# Patient Record
Sex: FEMALE | Race: WHITE | NOT HISPANIC OR LATINO | Employment: FULL TIME | URBAN - METROPOLITAN AREA
[De-identification: names, ages, dates, MRNs, and addresses within clinical notes are randomized per-mention and may not be internally consistent; named-entity substitution may affect disease eponyms.]

---

## 2020-07-06 ENCOUNTER — LAB VISIT (OUTPATIENT)
Dept: LAB | Facility: HOSPITAL | Age: 65
End: 2020-07-06
Attending: NURSE PRACTITIONER
Payer: COMMERCIAL

## 2020-07-06 DIAGNOSIS — L40.50 PSORIATIC ARTHROPATHY: Primary | ICD-10-CM

## 2020-07-06 LAB
ALBUMIN SERPL BCP-MCNC: 3.9 G/DL (ref 3.5–5.2)
ALP SERPL-CCNC: 59 U/L (ref 55–135)
ALT SERPL W/O P-5'-P-CCNC: 22 U/L (ref 10–44)
ANION GAP SERPL CALC-SCNC: 9 MMOL/L (ref 8–16)
AST SERPL-CCNC: 25 U/L (ref 10–40)
BASOPHILS # BLD AUTO: 0.05 K/UL (ref 0–0.2)
BASOPHILS NFR BLD: 1.1 % (ref 0–1.9)
BILIRUB DIRECT SERPL-MCNC: <0.1 MG/DL (ref 0.1–0.3)
BILIRUB SERPL-MCNC: 0.7 MG/DL (ref 0.1–1)
BUN SERPL-MCNC: 29 MG/DL (ref 8–23)
CALCIUM SERPL-MCNC: 8.6 MG/DL (ref 8.7–10.5)
CHLORIDE SERPL-SCNC: 101 MMOL/L (ref 95–110)
CO2 SERPL-SCNC: 26 MMOL/L (ref 23–29)
CREAT SERPL-MCNC: 1.3 MG/DL (ref 0.5–1.4)
DIFFERENTIAL METHOD: ABNORMAL
EOSINOPHIL # BLD AUTO: 0.1 K/UL (ref 0–0.5)
EOSINOPHIL NFR BLD: 2.8 % (ref 0–8)
ERYTHROCYTE [DISTWIDTH] IN BLOOD BY AUTOMATED COUNT: 13.9 % (ref 11.5–14.5)
EST. GFR  (AFRICAN AMERICAN): 50.1 ML/MIN/1.73 M^2
EST. GFR  (NON AFRICAN AMERICAN): 43.5 ML/MIN/1.73 M^2
GLUCOSE SERPL-MCNC: 85 MG/DL (ref 70–110)
HCT VFR BLD AUTO: 37.2 % (ref 37–48.5)
HGB BLD-MCNC: 12 G/DL (ref 12–16)
IMM GRANULOCYTES # BLD AUTO: 0.01 K/UL (ref 0–0.04)
IMM GRANULOCYTES NFR BLD AUTO: 0.2 % (ref 0–0.5)
LYMPHOCYTES # BLD AUTO: 1.6 K/UL (ref 1–4.8)
LYMPHOCYTES NFR BLD: 35.1 % (ref 18–48)
MCH RBC QN AUTO: 33.1 PG (ref 27–31)
MCHC RBC AUTO-ENTMCNC: 32.3 G/DL (ref 32–36)
MCV RBC AUTO: 103 FL (ref 82–98)
MONOCYTES # BLD AUTO: 0.5 K/UL (ref 0.3–1)
MONOCYTES NFR BLD: 10.3 % (ref 4–15)
NEUTROPHILS # BLD AUTO: 2.3 K/UL (ref 1.8–7.7)
NEUTROPHILS NFR BLD: 50.5 % (ref 38–73)
NRBC BLD-RTO: 0 /100 WBC
PLATELET # BLD AUTO: 226 K/UL (ref 150–350)
PMV BLD AUTO: 10.4 FL (ref 9.2–12.9)
POTASSIUM SERPL-SCNC: 4.4 MMOL/L (ref 3.5–5.1)
PROT SERPL-MCNC: 7.1 G/DL (ref 6–8.4)
RBC # BLD AUTO: 3.62 M/UL (ref 4–5.4)
SODIUM SERPL-SCNC: 136 MMOL/L (ref 136–145)
WBC # BLD AUTO: 4.64 K/UL (ref 3.9–12.7)

## 2020-07-06 PROCEDURE — 86703 HIV-1/HIV-2 1 RESULT ANTBDY: CPT

## 2020-07-06 PROCEDURE — 86480 TB TEST CELL IMMUN MEASURE: CPT

## 2020-07-06 PROCEDURE — 80048 BASIC METABOLIC PNL TOTAL CA: CPT

## 2020-07-06 PROCEDURE — 85025 COMPLETE CBC W/AUTO DIFF WBC: CPT

## 2020-07-06 PROCEDURE — 36415 COLL VENOUS BLD VENIPUNCTURE: CPT

## 2020-07-06 PROCEDURE — 87340 HEPATITIS B SURFACE AG IA: CPT

## 2020-07-06 PROCEDURE — 80076 HEPATIC FUNCTION PANEL: CPT

## 2020-07-07 LAB
HBV SURFACE AG SERPL QL IA: NEGATIVE
HIV 1+2 AB+HIV1 P24 AG SERPL QL IA: NEGATIVE

## 2020-07-08 LAB
GAMMA INTERFERON BACKGROUND BLD IA-ACNC: 0.02 IU/ML
M TB IFN-G CD4+ BCKGRND COR BLD-ACNC: -0.01 IU/ML
MITOGEN IGNF BCKGRD COR BLD-ACNC: 7.93 IU/ML
TB GOLD PLUS: NEGATIVE
TB2 - NIL: -0.01 IU/ML

## 2020-07-15 ENCOUNTER — LAB VISIT (OUTPATIENT)
Dept: LAB | Facility: HOSPITAL | Age: 65
End: 2020-07-15
Attending: NURSE PRACTITIONER
Payer: COMMERCIAL

## 2020-07-15 DIAGNOSIS — L40.50 PSORIASIS WITH ARTHROPATHY: Primary | ICD-10-CM

## 2020-07-15 LAB
ALBUMIN SERPL BCP-MCNC: 3.9 G/DL (ref 3.5–5.2)
ALP SERPL-CCNC: 61 U/L (ref 55–135)
ALT SERPL W/O P-5'-P-CCNC: 23 U/L (ref 10–44)
AST SERPL-CCNC: 25 U/L (ref 10–40)
BASOPHILS # BLD AUTO: 0.03 K/UL (ref 0–0.2)
BASOPHILS NFR BLD: 0.4 % (ref 0–1.9)
BILIRUB DIRECT SERPL-MCNC: 0.1 MG/DL (ref 0.1–0.3)
BILIRUB SERPL-MCNC: 0.7 MG/DL (ref 0.1–1)
DIFFERENTIAL METHOD: ABNORMAL
EOSINOPHIL # BLD AUTO: 0.2 K/UL (ref 0–0.5)
EOSINOPHIL NFR BLD: 3.1 % (ref 0–8)
ERYTHROCYTE [DISTWIDTH] IN BLOOD BY AUTOMATED COUNT: 14.2 % (ref 11.5–14.5)
HCT VFR BLD AUTO: 37 % (ref 37–48.5)
HGB BLD-MCNC: 11.6 G/DL (ref 12–16)
IMM GRANULOCYTES # BLD AUTO: 0.02 K/UL (ref 0–0.04)
IMM GRANULOCYTES NFR BLD AUTO: 0.3 % (ref 0–0.5)
LYMPHOCYTES # BLD AUTO: 2.5 K/UL (ref 1–4.8)
LYMPHOCYTES NFR BLD: 36.2 % (ref 18–48)
MCH RBC QN AUTO: 33 PG (ref 27–31)
MCHC RBC AUTO-ENTMCNC: 31.4 G/DL (ref 32–36)
MCV RBC AUTO: 105 FL (ref 82–98)
MONOCYTES # BLD AUTO: 0.6 K/UL (ref 0.3–1)
MONOCYTES NFR BLD: 8.9 % (ref 4–15)
NEUTROPHILS # BLD AUTO: 3.5 K/UL (ref 1.8–7.7)
NEUTROPHILS NFR BLD: 51.1 % (ref 38–73)
NRBC BLD-RTO: 0 /100 WBC
PLATELET # BLD AUTO: 211 K/UL (ref 150–350)
PMV BLD AUTO: 10.4 FL (ref 9.2–12.9)
PROT SERPL-MCNC: 7.2 G/DL (ref 6–8.4)
RBC # BLD AUTO: 3.52 M/UL (ref 4–5.4)
WBC # BLD AUTO: 6.85 K/UL (ref 3.9–12.7)

## 2020-07-15 PROCEDURE — 36415 COLL VENOUS BLD VENIPUNCTURE: CPT

## 2020-07-15 PROCEDURE — 85025 COMPLETE CBC W/AUTO DIFF WBC: CPT

## 2020-07-15 PROCEDURE — 80076 HEPATIC FUNCTION PANEL: CPT

## 2021-01-25 ENCOUNTER — TELEPHONE (OUTPATIENT)
Dept: ORTHOPEDICS | Facility: CLINIC | Age: 66
End: 2021-01-25

## 2021-02-04 ENCOUNTER — HOSPITAL ENCOUNTER (OUTPATIENT)
Dept: RADIOLOGY | Facility: HOSPITAL | Age: 66
Discharge: HOME | End: 2021-02-04
Attending: ORTHOPAEDIC SURGERY
Payer: COMMERCIAL

## 2021-02-04 ENCOUNTER — TELEPHONE (OUTPATIENT)
Dept: ORTHOPEDICS | Facility: CLINIC | Age: 66
End: 2021-02-04

## 2021-02-04 ENCOUNTER — OFFICE VISIT (OUTPATIENT)
Dept: ORTHOPEDICS | Facility: CLINIC | Age: 66
End: 2021-02-04
Payer: COMMERCIAL

## 2021-02-04 VITALS
RESPIRATION RATE: 18 BRPM | BODY MASS INDEX: 29.19 KG/M2 | HEART RATE: 76 BPM | WEIGHT: 186 LBS | HEIGHT: 67 IN | OXYGEN SATURATION: 98 %

## 2021-02-04 DIAGNOSIS — M25.561 CHRONIC PAIN OF RIGHT KNEE: ICD-10-CM

## 2021-02-04 DIAGNOSIS — M25.561 ACUTE PAIN OF RIGHT KNEE: Primary | ICD-10-CM

## 2021-02-04 DIAGNOSIS — M17.11 PRIMARY OSTEOARTHRITIS OF RIGHT KNEE: Primary | ICD-10-CM

## 2021-02-04 DIAGNOSIS — M25.561 ACUTE PAIN OF RIGHT KNEE: ICD-10-CM

## 2021-02-04 DIAGNOSIS — M25.562 ACUTE PAIN OF LEFT KNEE: Primary | ICD-10-CM

## 2021-02-04 DIAGNOSIS — M25.562 ACUTE PAIN OF LEFT KNEE: ICD-10-CM

## 2021-02-04 DIAGNOSIS — G89.29 CHRONIC PAIN OF RIGHT KNEE: ICD-10-CM

## 2021-02-04 PROCEDURE — 20610 LARGE JOINT ASPIRATION/INJECTION: R KNEE: ICD-10-PCS | Mod: RT,S$GLB,, | Performed by: ORTHOPAEDIC SURGERY

## 2021-02-04 PROCEDURE — 99204 OFFICE O/P NEW MOD 45 MIN: CPT | Mod: 25,S$GLB,, | Performed by: ORTHOPAEDIC SURGERY

## 2021-02-04 PROCEDURE — 20610 DRAIN/INJ JOINT/BURSA W/O US: CPT | Mod: RT,S$GLB,, | Performed by: ORTHOPAEDIC SURGERY

## 2021-02-04 PROCEDURE — 99999 PR PBB SHADOW E&M-EST. PATIENT-LVL III: CPT | Mod: PBBFAC,,, | Performed by: ORTHOPAEDIC SURGERY

## 2021-02-04 PROCEDURE — 1126F AMNT PAIN NOTED NONE PRSNT: CPT | Mod: S$GLB,,, | Performed by: ORTHOPAEDIC SURGERY

## 2021-02-04 PROCEDURE — 1126F PR PAIN SEVERITY QUANTIFIED, NO PAIN PRESENT: ICD-10-PCS | Mod: S$GLB,,, | Performed by: ORTHOPAEDIC SURGERY

## 2021-02-04 PROCEDURE — 73562 X-RAY EXAM OF KNEE 3: CPT | Mod: 26,RT,, | Performed by: RADIOLOGY

## 2021-02-04 PROCEDURE — 73562 X-RAY EXAM OF KNEE 3: CPT | Mod: TC,PN,RT

## 2021-02-04 PROCEDURE — 99204 PR OFFICE/OUTPT VISIT, NEW, LEVL IV, 45-59 MIN: ICD-10-PCS | Mod: 25,S$GLB,, | Performed by: ORTHOPAEDIC SURGERY

## 2021-02-04 PROCEDURE — 3008F PR BODY MASS INDEX (BMI) DOCUMENTED: ICD-10-PCS | Mod: S$GLB,,, | Performed by: ORTHOPAEDIC SURGERY

## 2021-02-04 PROCEDURE — 3008F BODY MASS INDEX DOCD: CPT | Mod: S$GLB,,, | Performed by: ORTHOPAEDIC SURGERY

## 2021-02-04 PROCEDURE — 99999 PR PBB SHADOW E&M-EST. PATIENT-LVL III: ICD-10-PCS | Mod: PBBFAC,,, | Performed by: ORTHOPAEDIC SURGERY

## 2021-02-04 PROCEDURE — 73562 XR KNEE 3 VIEW RIGHT: ICD-10-PCS | Mod: 26,RT,, | Performed by: RADIOLOGY

## 2021-02-04 RX ORDER — PROPRANOLOL HYDROCHLORIDE 60 MG/1
CAPSULE, EXTENDED RELEASE ORAL
COMMUNITY
End: 2022-03-02 | Stop reason: SDUPTHER

## 2021-02-04 RX ORDER — ALENDRONATE SODIUM 70 MG/1
70 TABLET ORAL WEEKLY
COMMUNITY
Start: 2021-01-18 | End: 2022-02-17

## 2021-02-04 RX ORDER — SUMATRIPTAN 50 MG/1
TABLET, FILM COATED ORAL
COMMUNITY
End: 2023-01-30

## 2021-02-04 RX ORDER — TRIAMCINOLONE ACETONIDE 40 MG/ML
40 INJECTION, SUSPENSION INTRA-ARTICULAR; INTRAMUSCULAR
Status: DISCONTINUED | OUTPATIENT
Start: 2021-02-04 | End: 2021-02-04 | Stop reason: HOSPADM

## 2021-02-04 RX ORDER — LEVOTHYROXINE SODIUM 88 UG/1
88 TABLET ORAL DAILY
COMMUNITY
Start: 2021-01-08 | End: 2022-08-22

## 2021-02-04 RX ADMIN — TRIAMCINOLONE ACETONIDE 40 MG: 40 INJECTION, SUSPENSION INTRA-ARTICULAR; INTRAMUSCULAR at 01:02

## 2021-02-19 ENCOUNTER — TELEPHONE (OUTPATIENT)
Dept: ORTHOPEDICS | Facility: CLINIC | Age: 66
End: 2021-02-19

## 2021-03-04 ENCOUNTER — OFFICE VISIT (OUTPATIENT)
Dept: ORTHOPEDICS | Facility: CLINIC | Age: 66
End: 2021-03-04
Payer: COMMERCIAL

## 2021-03-04 VITALS — TEMPERATURE: 98 F | HEIGHT: 67 IN | BODY MASS INDEX: 29.19 KG/M2 | WEIGHT: 186 LBS

## 2021-03-04 DIAGNOSIS — M17.11 PRIMARY OSTEOARTHRITIS OF RIGHT KNEE: Primary | ICD-10-CM

## 2021-03-04 PROCEDURE — 99999 PR PBB SHADOW E&M-EST. PATIENT-LVL III: CPT | Mod: PBBFAC,,, | Performed by: ORTHOPAEDIC SURGERY

## 2021-03-04 PROCEDURE — 3008F BODY MASS INDEX DOCD: CPT | Mod: S$GLB,,, | Performed by: ORTHOPAEDIC SURGERY

## 2021-03-04 PROCEDURE — 1126F PR PAIN SEVERITY QUANTIFIED, NO PAIN PRESENT: ICD-10-PCS | Mod: S$GLB,,, | Performed by: ORTHOPAEDIC SURGERY

## 2021-03-04 PROCEDURE — 99499 UNLISTED E&M SERVICE: CPT | Mod: S$GLB,,, | Performed by: ORTHOPAEDIC SURGERY

## 2021-03-04 PROCEDURE — 1126F AMNT PAIN NOTED NONE PRSNT: CPT | Mod: S$GLB,,, | Performed by: ORTHOPAEDIC SURGERY

## 2021-03-04 PROCEDURE — 99499 NO LOS: ICD-10-PCS | Mod: S$GLB,,, | Performed by: ORTHOPAEDIC SURGERY

## 2021-03-04 PROCEDURE — 3008F PR BODY MASS INDEX (BMI) DOCUMENTED: ICD-10-PCS | Mod: S$GLB,,, | Performed by: ORTHOPAEDIC SURGERY

## 2021-03-04 PROCEDURE — 99999 PR PBB SHADOW E&M-EST. PATIENT-LVL III: ICD-10-PCS | Mod: PBBFAC,,, | Performed by: ORTHOPAEDIC SURGERY

## 2021-06-22 ENCOUNTER — TELEPHONE (OUTPATIENT)
Dept: ORTHOPEDICS | Facility: CLINIC | Age: 66
End: 2021-06-22

## 2021-06-25 ENCOUNTER — OFFICE VISIT (OUTPATIENT)
Dept: ORTHOPEDICS | Facility: CLINIC | Age: 66
End: 2021-06-25
Payer: COMMERCIAL

## 2021-06-25 VITALS
HEART RATE: 78 BPM | RESPIRATION RATE: 19 BRPM | OXYGEN SATURATION: 98 % | BODY MASS INDEX: 29.19 KG/M2 | HEIGHT: 67 IN | WEIGHT: 186 LBS

## 2021-06-25 DIAGNOSIS — M25.561 CHRONIC PAIN OF BOTH KNEES: ICD-10-CM

## 2021-06-25 DIAGNOSIS — M25.562 CHRONIC PAIN OF BOTH KNEES: ICD-10-CM

## 2021-06-25 DIAGNOSIS — M17.0 PRIMARY OSTEOARTHRITIS OF BOTH KNEES: Primary | ICD-10-CM

## 2021-06-25 DIAGNOSIS — G89.29 CHRONIC PAIN OF BOTH KNEES: ICD-10-CM

## 2021-06-25 DIAGNOSIS — M71.21 BAKER CYST, RIGHT: ICD-10-CM

## 2021-06-25 PROCEDURE — 1125F PR PAIN SEVERITY QUANTIFIED, PAIN PRESENT: ICD-10-PCS | Mod: S$GLB,,, | Performed by: ORTHOPAEDIC SURGERY

## 2021-06-25 PROCEDURE — 99214 PR OFFICE/OUTPT VISIT, EST, LEVL IV, 30-39 MIN: ICD-10-PCS | Mod: 25,S$GLB,, | Performed by: ORTHOPAEDIC SURGERY

## 2021-06-25 PROCEDURE — 3008F PR BODY MASS INDEX (BMI) DOCUMENTED: ICD-10-PCS | Mod: S$GLB,,, | Performed by: ORTHOPAEDIC SURGERY

## 2021-06-25 PROCEDURE — 20610 DRAIN/INJ JOINT/BURSA W/O US: CPT | Mod: 50,S$GLB,, | Performed by: ORTHOPAEDIC SURGERY

## 2021-06-25 PROCEDURE — 99999 PR PBB SHADOW E&M-EST. PATIENT-LVL III: CPT | Mod: PBBFAC,,, | Performed by: ORTHOPAEDIC SURGERY

## 2021-06-25 PROCEDURE — 20610 LARGE JOINT ASPIRATION/INJECTION: BILATERAL KNEE: ICD-10-PCS | Mod: 50,S$GLB,, | Performed by: ORTHOPAEDIC SURGERY

## 2021-06-25 PROCEDURE — 99214 OFFICE O/P EST MOD 30 MIN: CPT | Mod: 25,S$GLB,, | Performed by: ORTHOPAEDIC SURGERY

## 2021-06-25 PROCEDURE — 99999 PR PBB SHADOW E&M-EST. PATIENT-LVL III: ICD-10-PCS | Mod: PBBFAC,,, | Performed by: ORTHOPAEDIC SURGERY

## 2021-06-25 PROCEDURE — 1125F AMNT PAIN NOTED PAIN PRSNT: CPT | Mod: S$GLB,,, | Performed by: ORTHOPAEDIC SURGERY

## 2021-06-25 PROCEDURE — 3008F BODY MASS INDEX DOCD: CPT | Mod: S$GLB,,, | Performed by: ORTHOPAEDIC SURGERY

## 2021-06-25 RX ORDER — TRIAMCINOLONE ACETONIDE 40 MG/ML
40 INJECTION, SUSPENSION INTRA-ARTICULAR; INTRAMUSCULAR
Status: DISCONTINUED | OUTPATIENT
Start: 2021-06-25 | End: 2021-06-25 | Stop reason: HOSPADM

## 2021-06-25 RX ADMIN — TRIAMCINOLONE ACETONIDE 40 MG: 40 INJECTION, SUSPENSION INTRA-ARTICULAR; INTRAMUSCULAR at 08:06

## 2021-10-05 ENCOUNTER — OFFICE VISIT (OUTPATIENT)
Dept: ORTHOPEDICS | Facility: CLINIC | Age: 66
End: 2021-10-05
Payer: COMMERCIAL

## 2021-10-05 VITALS — BODY MASS INDEX: 29.19 KG/M2 | HEIGHT: 67 IN | HEART RATE: 94 BPM | WEIGHT: 186 LBS | OXYGEN SATURATION: 98 %

## 2021-10-05 DIAGNOSIS — M70.51 PES ANSERINUS BURSITIS OF RIGHT KNEE: Primary | ICD-10-CM

## 2021-10-05 DIAGNOSIS — M17.11 PRIMARY OSTEOARTHRITIS OF RIGHT KNEE: ICD-10-CM

## 2021-10-05 DIAGNOSIS — M25.561 CHRONIC PAIN OF RIGHT KNEE: ICD-10-CM

## 2021-10-05 DIAGNOSIS — G89.29 CHRONIC PAIN OF RIGHT KNEE: ICD-10-CM

## 2021-10-05 DIAGNOSIS — M71.21 BAKER CYST, RIGHT: ICD-10-CM

## 2021-10-05 PROCEDURE — 1101F PT FALLS ASSESS-DOCD LE1/YR: CPT | Mod: S$GLB,,, | Performed by: ORTHOPAEDIC SURGERY

## 2021-10-05 PROCEDURE — 20610 DRAIN/INJ JOINT/BURSA W/O US: CPT | Mod: RT,S$GLB,, | Performed by: ORTHOPAEDIC SURGERY

## 2021-10-05 PROCEDURE — 1126F AMNT PAIN NOTED NONE PRSNT: CPT | Mod: S$GLB,,, | Performed by: ORTHOPAEDIC SURGERY

## 2021-10-05 PROCEDURE — 20610 LARGE JOINT ASPIRATION/INJECTION: R ANSERINE BURSA: ICD-10-PCS | Mod: RT,S$GLB,, | Performed by: ORTHOPAEDIC SURGERY

## 2021-10-05 PROCEDURE — 1159F PR MEDICATION LIST DOCUMENTED IN MEDICAL RECORD: ICD-10-PCS | Mod: S$GLB,,, | Performed by: ORTHOPAEDIC SURGERY

## 2021-10-05 PROCEDURE — 3008F BODY MASS INDEX DOCD: CPT | Mod: S$GLB,,, | Performed by: ORTHOPAEDIC SURGERY

## 2021-10-05 PROCEDURE — 99999 PR PBB SHADOW E&M-EST. PATIENT-LVL III: CPT | Mod: PBBFAC,,, | Performed by: ORTHOPAEDIC SURGERY

## 2021-10-05 PROCEDURE — 3288F PR FALLS RISK ASSESSMENT DOCUMENTED: ICD-10-PCS | Mod: S$GLB,,, | Performed by: ORTHOPAEDIC SURGERY

## 2021-10-05 PROCEDURE — 3288F FALL RISK ASSESSMENT DOCD: CPT | Mod: S$GLB,,, | Performed by: ORTHOPAEDIC SURGERY

## 2021-10-05 PROCEDURE — 99999 PR PBB SHADOW E&M-EST. PATIENT-LVL III: ICD-10-PCS | Mod: PBBFAC,,, | Performed by: ORTHOPAEDIC SURGERY

## 2021-10-05 PROCEDURE — 99213 OFFICE O/P EST LOW 20 MIN: CPT | Mod: 25,S$GLB,, | Performed by: ORTHOPAEDIC SURGERY

## 2021-10-05 PROCEDURE — 1159F MED LIST DOCD IN RCRD: CPT | Mod: S$GLB,,, | Performed by: ORTHOPAEDIC SURGERY

## 2021-10-05 PROCEDURE — 99213 PR OFFICE/OUTPT VISIT, EST, LEVL III, 20-29 MIN: ICD-10-PCS | Mod: 25,S$GLB,, | Performed by: ORTHOPAEDIC SURGERY

## 2021-10-05 PROCEDURE — 1101F PR PT FALLS ASSESS DOC 0-1 FALLS W/OUT INJ PAST YR: ICD-10-PCS | Mod: S$GLB,,, | Performed by: ORTHOPAEDIC SURGERY

## 2021-10-05 PROCEDURE — 1126F PR PAIN SEVERITY QUANTIFIED, NO PAIN PRESENT: ICD-10-PCS | Mod: S$GLB,,, | Performed by: ORTHOPAEDIC SURGERY

## 2021-10-05 PROCEDURE — 3008F PR BODY MASS INDEX (BMI) DOCUMENTED: ICD-10-PCS | Mod: S$GLB,,, | Performed by: ORTHOPAEDIC SURGERY

## 2021-10-05 RX ORDER — TRIAMCINOLONE ACETONIDE 40 MG/ML
40 INJECTION, SUSPENSION INTRA-ARTICULAR; INTRAMUSCULAR
Status: DISCONTINUED | OUTPATIENT
Start: 2021-10-05 | End: 2021-10-05 | Stop reason: HOSPADM

## 2021-10-05 RX ORDER — FLUOXETINE HYDROCHLORIDE 20 MG/1
CAPSULE ORAL
COMMUNITY
Start: 2021-09-08 | End: 2022-08-22

## 2021-10-05 RX ADMIN — TRIAMCINOLONE ACETONIDE 40 MG: 40 INJECTION, SUSPENSION INTRA-ARTICULAR; INTRAMUSCULAR at 10:10

## 2022-02-03 ENCOUNTER — PATIENT OUTREACH (OUTPATIENT)
Dept: ADMINISTRATIVE | Facility: HOSPITAL | Age: 67
End: 2022-02-03
Payer: MEDICARE

## 2022-02-03 NOTE — LETTER
February 3, 2022    Lynne Lester  [address] [place] [address]             [facility]  [address]  [facility] [address]  Phone: [phone] Dear Frances C Thompson, [facility] is committed to your overall health.  To help you get the most out of each of your visits, we will review your information to make sure you are up to date on all of your recommended tests and/or procedures.      As a new patient to GRISEL SORIANO MD , we may not have your complete medical records.  He has found that your chart shows you may be due for:    Health Maintenance Due   Topic Date Due    Hepatitis C Screening  Never done    Lipid Panel  Never done    TETANUS VACCINE  Never done    Mammogram  Never done    DEXA SCAN  Never done    Colorectal Cancer Screening  Never done    Shingles Vaccine (1 of 2) Never done    Pneumococcal Vaccines (Age 65+) (1 of 1 - PPSV23) Never done    Influenza Vaccine (1) 09/01/2021     If you have had any of the above done at another facility, please let us know so that we may obtain copies from that facility.  If you have a copy of these records, please provide a copy for us to scan into your chart.    If you are currently taking medication, please bring it with you to your appointment for review.    Also, if you have any type of Advanced Directives, please bring them with you to your office visit so we may scan them into your chart.      Pura Og LPN  Performance Improvement Coordinator  [facility]  [address] [facility] [place], [place] [id number] [phone]

## 2022-02-17 ENCOUNTER — OFFICE VISIT (OUTPATIENT)
Dept: FAMILY MEDICINE | Facility: CLINIC | Age: 67
End: 2022-02-17
Payer: MEDICARE

## 2022-02-17 VITALS
HEIGHT: 67 IN | RESPIRATION RATE: 12 BRPM | HEART RATE: 77 BPM | DIASTOLIC BLOOD PRESSURE: 70 MMHG | BODY MASS INDEX: 29.51 KG/M2 | WEIGHT: 188 LBS | OXYGEN SATURATION: 96 % | TEMPERATURE: 98 F | SYSTOLIC BLOOD PRESSURE: 118 MMHG

## 2022-02-17 DIAGNOSIS — M81.0 AGE RELATED OSTEOPOROSIS, UNSPECIFIED PATHOLOGICAL FRACTURE PRESENCE: ICD-10-CM

## 2022-02-17 DIAGNOSIS — G89.29 CHRONIC PAIN OF RIGHT KNEE: ICD-10-CM

## 2022-02-17 DIAGNOSIS — Z76.89 ENCOUNTER TO ESTABLISH CARE: Primary | ICD-10-CM

## 2022-02-17 DIAGNOSIS — G43.709 CHRONIC MIGRAINE WITHOUT AURA WITHOUT STATUS MIGRAINOSUS, NOT INTRACTABLE: ICD-10-CM

## 2022-02-17 DIAGNOSIS — M25.561 CHRONIC PAIN OF RIGHT KNEE: ICD-10-CM

## 2022-02-17 DIAGNOSIS — E03.9 HYPOTHYROIDISM, UNSPECIFIED TYPE: ICD-10-CM

## 2022-02-17 DIAGNOSIS — E66.3 OVERWEIGHT (BMI 25.0-29.9): ICD-10-CM

## 2022-02-17 PROCEDURE — 99204 OFFICE O/P NEW MOD 45 MIN: CPT | Mod: S$GLB,,, | Performed by: FAMILY MEDICINE

## 2022-02-17 PROCEDURE — 1101F PT FALLS ASSESS-DOCD LE1/YR: CPT | Mod: CPTII,S$GLB,, | Performed by: FAMILY MEDICINE

## 2022-02-17 PROCEDURE — 1160F RVW MEDS BY RX/DR IN RCRD: CPT | Mod: CPTII,S$GLB,, | Performed by: FAMILY MEDICINE

## 2022-02-17 PROCEDURE — 3074F PR MOST RECENT SYSTOLIC BLOOD PRESSURE < 130 MM HG: ICD-10-PCS | Mod: CPTII,S$GLB,, | Performed by: FAMILY MEDICINE

## 2022-02-17 PROCEDURE — 1159F PR MEDICATION LIST DOCUMENTED IN MEDICAL RECORD: ICD-10-PCS | Mod: CPTII,S$GLB,, | Performed by: FAMILY MEDICINE

## 2022-02-17 PROCEDURE — 99999 PR PBB SHADOW E&M-EST. PATIENT-LVL IV: ICD-10-PCS | Mod: PBBFAC,,, | Performed by: FAMILY MEDICINE

## 2022-02-17 PROCEDURE — 1101F PR PT FALLS ASSESS DOC 0-1 FALLS W/OUT INJ PAST YR: ICD-10-PCS | Mod: CPTII,S$GLB,, | Performed by: FAMILY MEDICINE

## 2022-02-17 PROCEDURE — 3078F DIAST BP <80 MM HG: CPT | Mod: CPTII,S$GLB,, | Performed by: FAMILY MEDICINE

## 2022-02-17 PROCEDURE — 3288F FALL RISK ASSESSMENT DOCD: CPT | Mod: CPTII,S$GLB,, | Performed by: FAMILY MEDICINE

## 2022-02-17 PROCEDURE — 3074F SYST BP LT 130 MM HG: CPT | Mod: CPTII,S$GLB,, | Performed by: FAMILY MEDICINE

## 2022-02-17 PROCEDURE — 99204 PR OFFICE/OUTPT VISIT, NEW, LEVL IV, 45-59 MIN: ICD-10-PCS | Mod: S$GLB,,, | Performed by: FAMILY MEDICINE

## 2022-02-17 PROCEDURE — 1126F AMNT PAIN NOTED NONE PRSNT: CPT | Mod: CPTII,S$GLB,, | Performed by: FAMILY MEDICINE

## 2022-02-17 PROCEDURE — 1160F PR REVIEW ALL MEDS BY PRESCRIBER/CLIN PHARMACIST DOCUMENTED: ICD-10-PCS | Mod: CPTII,S$GLB,, | Performed by: FAMILY MEDICINE

## 2022-02-17 PROCEDURE — 3078F PR MOST RECENT DIASTOLIC BLOOD PRESSURE < 80 MM HG: ICD-10-PCS | Mod: CPTII,S$GLB,, | Performed by: FAMILY MEDICINE

## 2022-02-17 PROCEDURE — 1126F PR PAIN SEVERITY QUANTIFIED, NO PAIN PRESENT: ICD-10-PCS | Mod: CPTII,S$GLB,, | Performed by: FAMILY MEDICINE

## 2022-02-17 PROCEDURE — 1159F MED LIST DOCD IN RCRD: CPT | Mod: CPTII,S$GLB,, | Performed by: FAMILY MEDICINE

## 2022-02-17 PROCEDURE — 99999 PR PBB SHADOW E&M-EST. PATIENT-LVL IV: CPT | Mod: PBBFAC,,, | Performed by: FAMILY MEDICINE

## 2022-02-17 PROCEDURE — 3008F PR BODY MASS INDEX (BMI) DOCUMENTED: ICD-10-PCS | Mod: CPTII,S$GLB,, | Performed by: FAMILY MEDICINE

## 2022-02-17 PROCEDURE — 3288F PR FALLS RISK ASSESSMENT DOCUMENTED: ICD-10-PCS | Mod: CPTII,S$GLB,, | Performed by: FAMILY MEDICINE

## 2022-02-17 PROCEDURE — 3008F BODY MASS INDEX DOCD: CPT | Mod: CPTII,S$GLB,, | Performed by: FAMILY MEDICINE

## 2022-02-17 RX ORDER — IBANDRONATE SODIUM 150 MG/1
150 TABLET, FILM COATED ORAL
Qty: 1 TABLET | Refills: 11 | Status: SHIPPED | OUTPATIENT
Start: 2022-02-17 | End: 2022-08-23

## 2022-02-17 RX ORDER — CHOLECALCIFEROL (VITAMIN D3) 125 MCG
5000 CAPSULE ORAL
COMMUNITY

## 2022-02-17 RX ORDER — MELOXICAM 15 MG/1
TABLET ORAL
COMMUNITY
Start: 2022-01-11 | End: 2022-04-01 | Stop reason: SDUPTHER

## 2022-02-17 NOTE — PROGRESS NOTES
[facility] Note    Subjective      Ms. Lester is a 66 y.o. female who presents to clinic for [facility]    Patient presents to [facility].  She was previously being seen by Anita Mahan.  She has a history of hypothyroidism currently on levothyroxine 80 mcg daily.  She also has a history migraine headaches.  She takes propanolol 60 mg extended release daily.  She states that her insurance suggested some alternatives due to the propanolol not being on formulary.  She will call back with the those names.  She also has a history of osteoporosis.  She takes vitamin-D and Fosamax but she finds it hard to remember to take the Fosamax at times.  She also has chronic pain particularly of her right knee.  She is seen by Dr. Alford.  Injections have been helpful.  She is at the stage for needing a knee replacement but wants to prolong this.  She also has a history weight loss surgery.  She would like to try Ozempic to help with weight loss.  She tried this about 6 months ago and it was helpful.    [facility] Lynne has a past medical history of Carpal tunnel syndrome, Hypertension, Hypothyroidism (2/17/2022), and Osteoarthritis.   PSX Lynne has a past surgical history that includes Tonsillectomy and Appendectomy.    Lynne's family history includes Alzheimer's disease in her mother; COPD in her father.   GLEN Batista reports that she has quit smoking. She has never used smokeless tobacco. She reports current alcohol use. She reports that she does not use drugs.   MICHAELA Batista is allergic to codeine.   DENNIS Batista has a current medication list which includes the following prescription(s): cholecalciferol (vitamin d3), fluoxetine, levothyroxine, meloxicam, propranolol, sumatriptan, ibandronate, and semaglutide (weight loss).     Review of Systems   Constitutional: Negative for chills and fever.   HENT: Negative for congestion and rhinorrhea.    Eyes: Negative for visual disturbance.   Respiratory:  "Negative for cough and shortness of breath.    Cardiovascular: Negative for chest pain.   Gastrointestinal: Negative for abdominal pain, constipation, diarrhea, nausea and vomiting.   Genitourinary: Negative for dysuria.   Musculoskeletal: Positive for arthralgias. Negative for myalgias.   Skin: Negative for rash.   Neurological: Negative for weakness and headaches.     Objective     /70 (BP Location: Left arm, Patient Position: Sitting, BP Method: Large (Manual))   Pulse 77   Temp 97.9 °F (36.6 °C) (Temporal)   Resp 12   Ht 5' 7" (1.702 m)   Wt 85.3 kg (188 lb)   SpO2 96%   BMI 29.44 kg/m²     Physical Exam  Vitals and nursing note reviewed.   Constitutional:       General: She is not in acute distress.     Appearance: Normal appearance. She is well-developed. She is not diaphoretic.   HENT:      Head: Normocephalic and atraumatic.      Right Ear: External ear normal.      Left Ear: External ear normal.   Eyes:      General:         Right eye: No discharge.         Left eye: No discharge.   Cardiovascular:      Rate and Rhythm: Normal rate and regular rhythm.      Heart sounds: Normal heart sounds.   Pulmonary:      Effort: Pulmonary effort is normal.      Breath sounds: Normal breath sounds. No wheezing or rales.   Skin:     General: Skin is warm and dry.   Neurological:      Mental Status: She is alert and oriented to person, place, and time. Mental status is at baseline.   Psychiatric:         Mood and Affect: Mood normal.         Behavior: Behavior normal.         Thought Content: Thought content normal.         Judgment: Judgment normal.        Assessment/Plan     1. Encounter to establish care     2. Age related osteoporosis, unspecified pathological fracture presence  ibandronate (BONIVA) 150 mg tablet   3. Chronic pain of right knee  Ambulatory referral/consult to Physical/Occupational Therapy   4. Overweight (BMI 25.0-29.9)  semaglutide, weight loss, 0.25 mg/0.5 mL PnIj   5. Chronic migraine " without aura without status migrainosus, not intractable     6. Hypothyroidism, unspecified type       Chronic conditions controlled.  Continue current medications as prescribed.  Will switch Fosamax to the knee but to see if that will be easier to remember to take.  Referral to physical therapy for pain of the knee.  Recommended trying turmeric and glucosamine/chondroitin.  Prescribed Ozempic.  Patient will call back with the name for the alternate to propanolol that is covered by her insurance.  Will obtain records from her previous physicians for review.  Follow-up in 3 months.    Future Appointments   Date Time Provider Department Center   5/19/2022  9:20 AM Tyler Moreno MD [facility]         Tyler Moreno MD  Family Medicine  [facility] [place]

## 2022-02-23 ENCOUNTER — CLINICAL SUPPORT (OUTPATIENT)
Dept: REHABILITATION | Facility: HOSPITAL | Age: 67
End: 2022-02-23
Attending: FAMILY MEDICINE
Payer: MEDICARE

## 2022-02-23 DIAGNOSIS — Z11.59 NEED FOR HEPATITIS C SCREENING TEST: ICD-10-CM

## 2022-02-23 DIAGNOSIS — G89.29 CHRONIC PAIN OF RIGHT KNEE: ICD-10-CM

## 2022-02-23 DIAGNOSIS — Z12.31 OTHER SCREENING MAMMOGRAM: ICD-10-CM

## 2022-02-23 DIAGNOSIS — Z12.11 COLON CANCER SCREENING: ICD-10-CM

## 2022-02-23 DIAGNOSIS — Z74.09 IMPAIRED FUNCTIONAL MOBILITY AND ACTIVITY TOLERANCE: Primary | ICD-10-CM

## 2022-02-23 DIAGNOSIS — M25.561 CHRONIC PAIN OF RIGHT KNEE: ICD-10-CM

## 2022-02-23 PROCEDURE — 97162 PT EVAL MOD COMPLEX 30 MIN: CPT | Mod: PN

## 2022-02-23 NOTE — PLAN OF CARE
"[facility]   Physical Therapy Initial Evaluation     Date: 2/23/2022   Name: Lynne Lester  Clinic Number: [id number]    Therapy Diagnosis:   Encounter Diagnoses   Name Primary?    Impaired functional mobility and activity tolerance Yes    Chronic pain of right knee      Physician: Tyler Moreno MD    Physician Orders: PT Eval and Treat   Medical Diagnosis from Referral: Chronic pain of R knee  Evaluation Date: 2/23/2022  Authorization Period Expiration: 12/31/2022  Plan of Care Expiration: 4/8/2022  Progress Note Due: 3/23/2022  Visit # / Visits authorized: 1/ 1   FOTO: Next survey due week of 3/7/2022    Precautions: Standard     Time In: 1315  Time Out: 1400  Total Appointment Time (timed & untimed codes): 40 minutes      SUBJECTIVE     Date of onset: Chronic, Saw MD 2/17/2022    History of current condition - Lynne reports: she's had problems with both her knees for several years with symptoms progressively worsening.  She saw ortho over a year ago.  X-rays were taken (see results below).  Received steroid injections (2 or 3 in R, 1 in L) with some improvement; however, symptoms have since worsened.  Went to PCP last week.  Referred to PT.  She has been taking Mobic for about 6 months due to her knees.  MD did not change her medication.  Not sure if she has experienced any swelling.      Falls: None recently    Imaging, X-rays 2/4/2021: Findings per report:  "The bones are osteopenic.  There is moderate right lateral tibiofemoral joint space narrowing.  There is tricompartmental osteophyte formation in the right knee.  There is valgus orientation of the right knee.  No acute fracture, dislocation, or osseous destructive process.  There is patellofemoral joint space narrowing.  There is a small right knee joint effusion.  There are possible loose bodies observed in the right popliteal fossa.  No chondrocalcinosis or erosions"  No recent diagnostic studies    Prior " Therapy: None  Social History:  lives with their family in 1 story home with no steps to enter.    Occupation: Retired.12/2021    Prior Level of Function: minimal difficulty with step negotiation, able to walk > 30 min, intermittent difficulty with sit <> stand and car transfers.  Sleep not disturbed by knee pain.   Current Level of Function: Difficulty with step negotiation, increase difficulty walking > 30 min (limiting ability to participate in exercise program). Intermittent difficulty with sit <> stand and car transfers.  Has difficulty initiating gait upon rising in the morning.  Mild difficulty with housework and cooking.  Sleep is disturbed    Pain:  Current 0/10, worst 4/10, best 0/10   Location: bilateral knees lateral aspect.    Description: Intermittent shooting pain, occasional aching,   Reports that with sustained walking she develops pain that extends down peroneus longus region and anterior ankle.    Aggravating Factors: Getting started in the morning, walking for long periods of time (> 30 min), rolling over in bed, step negotiation (worse with ascending)  Easing Factors: glucosamine chondroitin, tumeric, heat.      Patients goals: To build some strength in my leg and to prevent knee surgery.       Medical History:   Past Medical History:   Diagnosis Date    Carpal tunnel syndrome     Bilateral    Hypertension     Hypothyroidism 2/17/2022    Osteoarthritis        Surgical History:   Lynne Lester  has a past surgical history that includes Tonsillectomy and Appendectomy.    Medications:   Lynne has a current medication list which includes the following prescription(s): cholecalciferol (vitamin d3), fluoxetine, ibandronate, levothyroxine, meloxicam, propranolol, semaglutide (weight loss), and sumatriptan.    Allergies:   Review of patient's allergies indicates:   Allergen Reactions    Codeine Rash        OBJECTIVE     Posture: Standing: weight shifted toward L. Genu valgus present R>L.   Sitting: unremarkable.    Palpation: Tenderness present R knee around patella extending to tibial tuberosity.    Sensation: No deficits reported this date.   DTRs:  Not applicable   Range of Motion/Strength:    Knee  Right   Left  Pain/Dysfunction with Movement    AROM PROM MMT AROM PROM MMT    Flexion  115*  120*  4/5-4+/5  120*  130*  4/5-4+/5    Extension  -18*  -10*  4/5  -5*  0*  4/5      B hip ROM grossly WNL with minimal rotation tightness R more so than L   Strength: Hip flexion 4-/5, ext 4/5; abd/add 4+/5;  Ankle ROM grossly WFL   Strength DF 4/5, otherwise 4/5-4+/5    Flexibility: Hamstring length 140* B; piriformis minimal tightness R>L; calves moderate tightness B.    Gait: Without AD  Analysis: Mildly antalgic favoring R LE  Bed Mobility:Independent  Transfers: Independent Increased UE support required.    Special Tests: anterior/posterior drawer (-) B; varus stress (+) on R for increased movement and crepitus, (-) on L; valgus stress (-) B; Jessica (-) B; Patellar mobility sluggish on R compared to L.  Patellar glide (+) on L for crepitus.    Other:   Girth:  Knee     Right  Left  @ joint line  41.1 cm 39.8 cm  @ 5 cm proximal 48.8 cm 47.0 cm  @ 10 cm proximal 51.0 cm 49.4 cm  @ 5 cm distal   39.7 cm 38.8 cm  @ 10 cm distal 45.8 cm 43.2 cm      Limitation/Restriction for FOTO Knee Survey    Therapist reviewed FOTO scores for Lynne Lester on 2/23/2022.   FOTO documents entered into [employer] - see Media section.    Limitation Score: 47%         TREATMENT     Total Treatment time (time-based codes) separate from Evaluation: 0 minutes     No treatment initiated this date.       PATIENT EDUCATION AND HOME EXERCISES     Education provided:   - Discussed role of PT and proposed POC.      Written Home Exercises Provided: To be issued during subsequent sessions. Exercises were reviewed and Lynne was able to demonstrate them prior to the end of the session.  Lynne demonstrated good  understanding of the  education provided. See EMR under Patient Instructions for exercises provided during therapy sessions.    ASSESSMENT     Lynne is a 66 y.o. female referred to outpatient Physical Therapy with a medical diagnosis of chronic R knee pain. Patient presents with B knee pain, impaired posture, decreased knee ROM, decreased LE flexibility, decreased LE strength, decreased stability of R knee, impaired patellar mobility.  These problems contribute to impaired functional mobility, impaired gait, and impaired activity tolerance.  He should benefit from skilled PT services to address these problems in order to minimize functional deficit.      Patient prognosis is Fair.   Patientt will benefit from skilled outpatient Physical Therapy to address the deficits stated above and in the chart below, provide patient /family education, and to maximize patientt's level of independence.     Plan of care discussed with patient: Yes  Patient's spiritual, cultural and educational needs considered and patient is agreeable to the plan of care and goals as stated below:     Anticipated Barriers for therapy: None    Medical Necessity is demonstrated by the following  History  Co-morbidities and personal factors that may impact the plan of care Co-morbidities:   difficulty sleeping, high BMI and HTN    Personal Factors:   no deficits     moderate   Examination  Body Structures and Functions, activity limitations and participation restrictions that may impact the plan of care Body Regions:   lower extremities    Body Systems:    gross symmetry  ROM  strength  gait  transfers    Participation Restrictions:   None known    Activity limitations:   Learning and applying knowledge  no deficits    General Tasks and Commands  no deficits    Communication  no deficits    Mobility  walking  driving (bike, car, motorcycle)    Self care  toileting    Domestic Life  shopping  doing house work (cleaning house, washing dishes,  laundry)    Interactions/Relationships  no deficits    Life Areas  no deficits    Community and Social Life  community life  recreation and leisure         moderate   Clinical Presentation evolving clinical presentation with changing clinical characteristics moderate   Decision Making/ Complexity Score: moderate     Goals:  Short Term Goals: 3 weeks    1) Facilitate improved LE flexibility   2) Facilitate improved R patellar mobility   3) Decreased R knee tenderness to minimal   4) Decrease swelling of R knee by 1/2 cm @ each affected level    Long Term Goals: 6 weeks    1) Patient will consistently walk > 30 min at a time without increased knee pain   2) Patient will consistently navigate 1 flight of steps utilizing reciprocating gait pattern with minimal increase in knee pain   3) Patient will consistently sleep > 6 hours without interruption by knee pain   4) Patient will consistently perform sit <> stand transfers safely with minimal UE support   5) Improve functional deficit to < 30% as indicated by FOTO knee survey   6) Patient will be independent in complete HEP     PLAN   Plan of care Certification: 2/23/2022 to 4/8/2022    Outpatient Physical Therapy 2 times weekly for 6 weeks to include the following interventions: Electrical Stimulation IFES, Gait Training, Manual Therapy, Moist Heat/ Ice, Orthotic Management and Training, Patient Education, Therapeutic Activities, Therapeutic Exercise and Therapeutic Taping.     Vibha Ricketts, PT      I CERTIFY THE NEED FOR THESE SERVICES FURNISHED UNDER THIS PLAN OF TREATMENT AND WHILE UNDER MY CARE   Physician's comments:     Physician's Signature: ___________________________________________________

## 2022-02-25 ENCOUNTER — CLINICAL SUPPORT (OUTPATIENT)
Dept: REHABILITATION | Facility: HOSPITAL | Age: 67
End: 2022-02-25
Attending: FAMILY MEDICINE
Payer: MEDICARE

## 2022-02-25 DIAGNOSIS — Z74.09 IMPAIRED FUNCTIONAL MOBILITY AND ACTIVITY TOLERANCE: ICD-10-CM

## 2022-02-25 DIAGNOSIS — M25.561 CHRONIC PAIN OF RIGHT KNEE: Primary | ICD-10-CM

## 2022-02-25 DIAGNOSIS — G89.29 CHRONIC PAIN OF RIGHT KNEE: Primary | ICD-10-CM

## 2022-02-25 PROCEDURE — 97110 THERAPEUTIC EXERCISES: CPT | Mod: PN,CQ

## 2022-02-25 NOTE — PROGRESS NOTES
Physical Therapy Daily Treatment Note   Name: Lynne Lester 1955  MRN: [id number]    Visit Date: 2/25/2022  Visit #: 2 / 12  Authorization period Expiration: 12/31/22    Plan of Care Expiration: 4/8/22  Precautions: standard    Time In: 8:55 am  Time Out: 9:35 am  Total 1:1 Treatment Time: 40 min    Treatment Diagnosis:   Encounter Diagnoses   Name Primary?    Chronic pain of right knee Yes    Impaired functional mobility and activity tolerance      Physician: Tyler Moreno MD    Subjective   Pt reports: stiffness in her knee is the worse in the morning.  She is compliant with home exercise program.     Pain Scale:  3/10 on VAS currently  Pain Location: right knee    Objective   Lynne received therapeutic exercises to develop strength and range of motion for 40 minutes including:    Nustep level 4 x 10 min    B Sitting hamstring stretch 3 x 30 sec  B Quad sets 1 min  B SLR 2 x 10  B SAQ x 2 min grey bolster  Ball squeeze x 2 min  Hip abd. With red t band 2 x 10    B Wedge stretch 3 x 30 sec  B Toe taps x 2 min  FSU 4 in step 2 x 10 (10 each leg)        Lynne participated in neuromuscular re-education       Lynne received the following manual therapy techniques:      Lynne received the following direct contact modalities after being cleared for contraindications:       Lynne received the following supervised modalities after being cleared for contradictions:      Home Exercises and Education Provided     Education provided re:   - progress towards goals   - role of therapy in multi - disciplinary team, goals for therapy  Pt educated on condition, POC, and expectations in therapy.  No spiritual or educational barriers to learning provided    Home exercises:  Pt will be provided HEP during course of treatment with progressions as appropriate. Pt was advised to perform these exercises free of pain, and to stop performing them if pain occurs.   Lynne demonstrated  good understanding of the education provided.     Assessment   Lynne is progressing well towards her goals and no updates to goals at this time.     Pt prognosis is good. Pt will continue to benefit from skilled outpatient physical therapy to address the deficits listed in the problem list chart on initial evaluation, provide pt/family education and to maximize pt's level of independence in the home and community environment.     Medical necessity is demonstrated by the impairments and functional limitations listed on the Initial Evaluation.     Anticipated barriers to physical therapy: none  Pt's spiritual, cultural and educational needs considered and pt agreeable to plan of care and goals.    Goals   Short Term Goals: 3 weeks               1) Facilitate improved LE flexibility              2) Facilitate improved R patellar mobility              3) Decreased R knee tenderness to minimal              4) Decrease swelling of R knee by 1/2 cm @ each affected level     Long Term Goals: 6 weeks               1) Patient will consistently walk > 30 min at a time without increased knee pain              2) Patient will consistently navigate 1 flight of steps utilizing reciprocating gait pattern with minimal increase in knee pain              3) Patient will consistently sleep > 6 hours without interruption by knee pain              4) Patient will consistently perform sit <> stand transfers safely with minimal UE support              5) Improve functional deficit to < 30% as indicated by FOTO knee survey              6) Patient will be independent in complete HEP       Plan   Continue with established Plan of Care towards Physical Therapy goals.   Discussed Plan of Care with patient: Yes    Vinay Amador, PTA  2/25/2022

## 2022-03-02 ENCOUNTER — PATIENT MESSAGE (OUTPATIENT)
Dept: ADMINISTRATIVE | Facility: HOSPITAL | Age: 67
End: 2022-03-02
Payer: MEDICARE

## 2022-03-02 ENCOUNTER — PATIENT MESSAGE (OUTPATIENT)
Dept: FAMILY MEDICINE | Facility: CLINIC | Age: 67
End: 2022-03-02
Payer: MEDICARE

## 2022-03-02 DIAGNOSIS — G43.709 CHRONIC MIGRAINE WITHOUT AURA WITHOUT STATUS MIGRAINOSUS, NOT INTRACTABLE: Primary | ICD-10-CM

## 2022-03-02 RX ORDER — PROPRANOLOL HYDROCHLORIDE 60 MG/1
60 CAPSULE, EXTENDED RELEASE ORAL DAILY
Qty: 90 CAPSULE | Refills: 3 | Status: SHIPPED | OUTPATIENT
Start: 2022-03-02 | End: 2022-08-22

## 2022-03-03 ENCOUNTER — CLINICAL SUPPORT (OUTPATIENT)
Dept: REHABILITATION | Facility: HOSPITAL | Age: 67
End: 2022-03-03
Attending: FAMILY MEDICINE
Payer: MEDICARE

## 2022-03-03 DIAGNOSIS — M25.561 CHRONIC PAIN OF RIGHT KNEE: ICD-10-CM

## 2022-03-03 DIAGNOSIS — G89.29 CHRONIC PAIN OF RIGHT KNEE: ICD-10-CM

## 2022-03-03 DIAGNOSIS — Z74.09 IMPAIRED FUNCTIONAL MOBILITY AND ACTIVITY TOLERANCE: Primary | ICD-10-CM

## 2022-03-03 PROCEDURE — 97110 THERAPEUTIC EXERCISES: CPT | Mod: PN,CQ

## 2022-03-03 NOTE — PROGRESS NOTES
"                            Physical Therapy Daily Treatment Note   Name: Lynne Lester 1955  MRN: [id number]    Visit Date: 3/3/2022  Visit #: 3 / 12  Authorization period Expiration: 12/31/22    Plan of Care Expiration: 4/8/22  Precautions: standard    Time In: 10:15 AM  Time Out: 11:10  AM  Total 1:1 Treatment Time: 40 min    Treatment Diagnosis:   Encounter Diagnoses   Name Primary?    Chronic pain of right knee     Impaired functional mobility and activity tolerance Yes     Physician: Tyler Moreno MD    Subjective   Pt reports: My knee got a little cranked up the past few days and has been bothering me.   She is compliant with home exercise program.     Pain Scale:  3-4/10 on VAS currently  Pain Location: right knee    Objective   Lynne received therapeutic exercises to develop strength and range of motion for 40 minutes including:    Nustep level 1 x 12 min  Seated hamstring stretch 3 x 30 sec  QS x 2 min  Bridges x 15  SLR 2 x 10  SAQ x 2 min grey bolster  Ball squeeze x 2 min  Supine Hip abd w/ red t band 2 x 10  Heel Cord stretch on wedge 3 x 30 sec  Heel Raises x 15  Toe taps on 6" step x 2 min  FSU 4" step x 10 each        Lynne participated in neuromuscular re-education       Lynne received the following manual therapy techniques:      Lynne received the following direct contact modalities after being cleared for contraindications:       Lynne received the following supervised modalities after being cleared for contradictions:      Home Exercises and Education Provided     Education provided re:   - progress towards goals   - role of therapy in multi - disciplinary team, goals for therapy  Pt educated on condition, POC, and expectations in therapy.  No spiritual or educational barriers to learning provided    Home exercises:  Pt will be provided HEP during course of treatment with progressions as appropriate. Pt was advised to perform these exercises free of pain, and to stop " performing them if pain occurs.   Lynne demonstrated good understanding of the education provided.     Assessment   Patient able to complete all exercises without any increased pain. General LE weakness noted.     Pt prognosis is good. Pt will continue to benefit from skilled outpatient physical therapy to address the deficits listed in the problem list chart on initial evaluation, provide pt/family education and to maximize pt's level of independence in the home and community environment.     Medical necessity is demonstrated by the impairments and functional limitations listed on the Initial Evaluation.     Anticipated barriers to physical therapy: none  Pt's spiritual, cultural and educational needs considered and pt agreeable to plan of care and goals.    Goals   Short Term Goals: 3 weeks               1) Facilitate improved LE flexibility              2) Facilitate improved R patellar mobility              3) Decreased R knee tenderness to minimal              4) Decrease swelling of R knee by 1/2 cm @ each affected level     Long Term Goals: 6 weeks               1) Patient will consistently walk > 30 min at a time without increased knee pain              2) Patient will consistently navigate 1 flight of steps utilizing reciprocating gait pattern with minimal increase in knee pain              3) Patient will consistently sleep > 6 hours without interruption by knee pain              4) Patient will consistently perform sit <> stand transfers safely with minimal UE support              5) Improve functional deficit to < 30% as indicated by FOTO knee survey              6) Patient will be independent in complete HEP       Plan   Progress with strengthening exercises as patient tolerates.   Discussed Plan of Care with patient: Yes    Jonathan Favre, PTA  3/3/2022

## 2022-03-07 ENCOUNTER — CLINICAL SUPPORT (OUTPATIENT)
Dept: REHABILITATION | Facility: HOSPITAL | Age: 67
End: 2022-03-07
Attending: FAMILY MEDICINE
Payer: MEDICARE

## 2022-03-07 DIAGNOSIS — M25.561 CHRONIC PAIN OF RIGHT KNEE: ICD-10-CM

## 2022-03-07 DIAGNOSIS — G89.29 CHRONIC PAIN OF RIGHT KNEE: ICD-10-CM

## 2022-03-07 DIAGNOSIS — Z74.09 IMPAIRED FUNCTIONAL MOBILITY AND ACTIVITY TOLERANCE: Primary | ICD-10-CM

## 2022-03-07 PROCEDURE — 97110 THERAPEUTIC EXERCISES: CPT | Mod: PN,CQ

## 2022-03-07 NOTE — PROGRESS NOTES
"                            Physical Therapy Daily Treatment Note   Name: Lynne Lester 1955  MRN: [id number]    Visit Date: 3/7/2022  Visit #: 4 / 12  Authorization period Expiration: 12/31/22    Plan of Care Expiration: 4/8/22  Precautions: standard    Time In: 10:10 AM  Time Out: 11:07  AM  Total 1:1 Treatment Time: 40 min    Treatment Diagnosis:   Encounter Diagnoses   Name Primary?    Chronic pain of right knee     Impaired functional mobility and activity tolerance Yes     Physician: Tyler Moreno MD    Subjective   Pt reports: I am not having any pain in my knee, but I still have to be careful when I first get up in the morning because it is stiff.  She is compliant with home exercise program.     Pain Scale:  0/10 on VAS currently  Pain Location: right knee    Objective   Lynne received therapeutic exercises to develop strength and range of motion for 40 minutes including:    Nustep level 1 x 12 min  Seated hamstring stretch 3 x 30 sec  QS x 2 min  Bridges x 15  SLR 2 x 10  SAQ x 2 min grey bolster  Ball squeeze x 2 min  Supine Hip abd w/ red t band 2 x 10  Heel Cord stretch on wedge 3 x 30 sec  Heel Raises x 15  Toe taps on 6" step x 2 min  FSU 4" step x 10 each        Lynne participated in neuromuscular re-education       Lynne received the following manual therapy techniques:      Lynne received the following direct contact modalities after being cleared for contraindications:       Lynne received the following supervised modalities after being cleared for contradictions:      Home Exercises and Education Provided     Education provided re:   - progress towards goals   - role of therapy in multi - disciplinary team, goals for therapy  Pt educated on condition, POC, and expectations in therapy.  No spiritual or educational barriers to learning provided    Home exercises:  Pt will be provided HEP during course of treatment with progressions as appropriate. Pt was advised to " perform these exercises free of pain, and to stop performing them if pain occurs.   Lynne demonstrated good understanding of the education provided.     Assessment   Patient able to complete all exercises without any increased pain. General LE weakness noted.     Pt prognosis is good. Pt will continue to benefit from skilled outpatient physical therapy to address the deficits listed in the problem list chart on initial evaluation, provide pt/family education and to maximize pt's level of independence in the home and community environment.     Medical necessity is demonstrated by the impairments and functional limitations listed on the Initial Evaluation.     Anticipated barriers to physical therapy: none  Pt's spiritual, cultural and educational needs considered and pt agreeable to plan of care and goals.    Goals   Short Term Goals: 3 weeks               1) Facilitate improved LE flexibility              2) Facilitate improved R patellar mobility              3) Decreased R knee tenderness to minimal              4) Decrease swelling of R knee by 1/2 cm @ each affected level     Long Term Goals: 6 weeks               1) Patient will consistently walk > 30 min at a time without increased knee pain              2) Patient will consistently navigate 1 flight of steps utilizing reciprocating gait pattern with minimal increase in knee pain              3) Patient will consistently sleep > 6 hours without interruption by knee pain              4) Patient will consistently perform sit <> stand transfers safely with minimal UE support              5) Improve functional deficit to < 30% as indicated by FOTO knee survey              6) Patient will be independent in complete HEP       Plan   Progress with strengthening exercises as patient tolerates.   Discussed Plan of Care with patient: Yes    Jonathan Favre, PTA  3/7/2022

## 2022-03-11 ENCOUNTER — CLINICAL SUPPORT (OUTPATIENT)
Dept: REHABILITATION | Facility: HOSPITAL | Age: 67
End: 2022-03-11
Attending: FAMILY MEDICINE
Payer: MEDICARE

## 2022-03-11 DIAGNOSIS — G89.29 CHRONIC PAIN OF RIGHT KNEE: ICD-10-CM

## 2022-03-11 DIAGNOSIS — M25.561 CHRONIC PAIN OF RIGHT KNEE: ICD-10-CM

## 2022-03-11 DIAGNOSIS — Z74.09 IMPAIRED FUNCTIONAL MOBILITY AND ACTIVITY TOLERANCE: Primary | ICD-10-CM

## 2022-03-11 PROCEDURE — 97110 THERAPEUTIC EXERCISES: CPT | Mod: PN

## 2022-03-11 NOTE — PROGRESS NOTES
"[facility]   Physical Therapy Treatment Note     Name: Lynne Lester  Clinic Number: [id number]    Therapy Diagnosis:   Encounter Diagnoses   Name Primary?    Impaired functional mobility and activity tolerance Yes    Chronic pain of right knee      Physician: Tyler Moreno MD    Visit Date: 3/11/2022    Physician Orders: PT Eval and Treat   Medical Diagnosis from Referral: Chronic pain of R knee  Evaluation Date: 2/23/2022  Authorization Period Expiration: 12/31/2022  Plan of Care Expiration: 4/8/2022  Progress Note Due: 3/23/2022  Visit # / Visits authorized: 5/ 12   FOTO: Knee Survey completed 3/11/2022 46% residual deficit   Next survey due week of 3/28/2022     Precautions: Standard      PTA Visit #: 0/5     Time In: 1238  Time Out: 1325  Total Billable Time: 38 minutes    SUBJECTIVE     Pt reports: "When I first started coming I was only walking 1 lap now I'm up to 3 or 4."   She was somewhat compliant with home exercise program.  Response to previous treatment: Felt okay  Functional change: increased activity tolerance    Pain: 1/10  Location: right knee      OBJECTIVE     Objective Measures updated at progress report unless specified.     Treatment     Lynne received the treatments listed below:      therapeutic exercises to develop strength, endurance and flexibility for 38 minutes including (+7 min @ no charge due to overlap with another patient):   Nustep level 3 x 10 min   Heel Cord stretch on wedge 3 x 30 sec   Heel Raises x 15   Forward Step Up 4" step x 10 each   Closed chain TKE 6" step 2x10 ea   Mini squat with glut med bias 2x10   LAQ 2x10    Seated hamstring stretch 3 x 30 sec   QS x 2 min   Bridges 2 x 10   SLR 2 x 10   SAQ x 2 min grey bolster   Ball squeeze x 2 min   Supine Hip abd w/medium theraloop 2 x 10      Patient Education and Home Exercises     Home Exercises Provided and Patient Education Provided     Education provided:   - Added closed " chain TKE, squat, and LAQ    Written Home Exercises Provided: Patient instructed to cont prior HEP. Illustrated instructions to be issued at subsequent visit. Exercises were reviewed and Lynne was able to demonstrate them prior to the end of the session.  Lynne demonstrated good  understanding of the education provided. See EMR under Patient Instructions for exercises provided during therapy sessions     ASSESSMENT     Patient struggled with maintaining correct knee position during squats as she tends to allow knees to collapse medially but can correct with verbal and visual cues.  Increased muscle fatigue of quads reported.      Lynne Is progressing well towards her goals.   Pt prognosis is Fair.     Pt will continue to benefit from skilled outpatient physical therapy to address the deficits listed in the problem list box on initial evaluation, provide pt/family education and to maximize pt's level of independence in the home and community environment.     Pt's spiritual, cultural and educational needs considered and pt agreeable to plan of care and goals.     Anticipated barriers to physical therapy: None    Goals:   Short Term Goals:               1) Facilitate improved LE flexibility Progressing              2) Facilitate improved R patellar mobility Progressing              3) Decreased R knee tenderness to minimal Progressing              4) Decrease swelling of R knee by 1/2 cm @ each affected level Ongoing     Long Term Goals:                1) Patient will consistently walk > 30 min at a time without increased knee pain Progressing              2) Patient will consistently navigate 1 flight of steps utilizing reciprocating gait pattern with minimal increase in knee pain Ongoing              3) Patient will consistently sleep > 6 hours without interruption by knee pain Progressing              4) Patient will consistently perform sit <> stand transfers safely with minimal UE support Progressing               5) Improve functional deficit to < 30% as indicated by FOTO knee survey Slight progress              6) Patient will be independent in complete HEP Progressing     PLAN     Patellar mobilization, soft tissue mobilization to reduce tenderness.  Increase height and reps on forward step ups.      Vibha Ricketts, PT

## 2022-03-18 ENCOUNTER — CLINICAL SUPPORT (OUTPATIENT)
Dept: REHABILITATION | Facility: HOSPITAL | Age: 67
End: 2022-03-18
Attending: FAMILY MEDICINE
Payer: MEDICARE

## 2022-03-18 DIAGNOSIS — Z74.09 IMPAIRED FUNCTIONAL MOBILITY AND ACTIVITY TOLERANCE: ICD-10-CM

## 2022-03-18 DIAGNOSIS — G89.29 CHRONIC PAIN OF RIGHT KNEE: Primary | ICD-10-CM

## 2022-03-18 DIAGNOSIS — M25.561 CHRONIC PAIN OF RIGHT KNEE: Primary | ICD-10-CM

## 2022-03-18 PROCEDURE — 97110 THERAPEUTIC EXERCISES: CPT | Mod: PN,CQ

## 2022-03-18 NOTE — PROGRESS NOTES
"[facility]   Physical Therapy Treatment Note     Name: Lynne Lester  Clinic Number: [id number]    Therapy Diagnosis:   Encounter Diagnoses   Name Primary?    Chronic pain of right knee Yes    Impaired functional mobility and activity tolerance      Physician: Tyler Moreno MD    Visit Date: 3/18/2022    Physician Orders: PT Eval and Treat   Medical Diagnosis from Referral: Chronic pain of R knee  Evaluation Date: 2/23/2022  Authorization Period Expiration: 12/31/2022  Plan of Care Expiration: 4/8/2022  Progress Note Due: 3/23/2022  Visit # / Visits authorized: 5/ 12   FOTO: Knee Survey completed 3/11/2022 46% residual deficit   Next survey due week of 3/28/2022     Precautions: Standard      PTA Visit #: 1/5     Time In: 12:00 pm  Time Out: 12:40 pm  Total Billable Time: 38 minutes    SUBJECTIVE     Pt reports: being sore after last treatment for three days.  She was somewhat compliant with home exercise program.  Response to previous treatment: Felt okay  Functional change: increased activity tolerance    Pain: 1/10  Location: right knee      OBJECTIVE     Objective Measures updated at progress report unless specified.     Treatment     Lynne received the treatments listed below:      therapeutic exercises to develop strength, endurance and flexibility for 38 minutes including (+7 min @ no charge due to overlap with another patient):   Nustep level 3 x 10 min   Heel Cord stretch on wedge 3 x 30 sec   Heel Raises x 15   Forward Step Up 4" step x 10 each    Closed chain TKE 4" step x10 ea   Mini squat with glut med bias x10   LAQ x10    Seated hamstring stretch 3 x 30 sec   QS x 2 min   Bridges 2 x 10     SLR 2 x 10   SAQ x 2 min grey bolster    Ball squeeze x 2 min   Supine Hip abd w/medium theraloop 2 x 10      Patient Education and Home Exercises     Home Exercises Provided and Patient Education Provided     Education provided:   - Added closed chain TKE, squat, and " LAQ    Written Home Exercises Provided: Patient instructed to cont prior HEP. Illustrated instructions to be issued at subsequent visit. Exercises were reviewed and Lynne was able to demonstrate them prior to the end of the session.  Lynne demonstrated good  understanding of the education provided. See EMR under Patient Instructions for exercises provided during therapy sessions     ASSESSMENT   Lowered the new exercises to one set due to poor response to last treatment.   Patient felt good after treatment.  Lynne Is progressing well towards her goals.   Pt prognosis is Fair.     Pt will continue to benefit from skilled outpatient physical therapy to address the deficits listed in the problem list box on initial evaluation, provide pt/family education and to maximize pt's level of independence in the home and community environment.     Pt's spiritual, cultural and educational needs considered and pt agreeable to plan of care and goals.     Anticipated barriers to physical therapy: None    Goals:   Short Term Goals:               1) Facilitate improved LE flexibility Progressing              2) Facilitate improved R patellar mobility Progressing              3) Decreased R knee tenderness to minimal Progressing              4) Decrease swelling of R knee by 1/2 cm @ each affected level Ongoing     Long Term Goals:                1) Patient will consistently walk > 30 min at a time without increased knee pain Progressing              2) Patient will consistently navigate 1 flight of steps utilizing reciprocating gait pattern with minimal increase in knee pain Ongoing              3) Patient will consistently sleep > 6 hours without interruption by knee pain Progressing              4) Patient will consistently perform sit <> stand transfers safely with minimal UE support Progressing              5) Improve functional deficit to < 30% as indicated by FOTO knee survey Slight progress              6) Patient will  be independent in complete HEP Progressing     PLAN     Patellar mobilization, soft tissue mobilization to reduce tenderness.  Increase height and reps on forward step ups.      Vinay Amador, PTA

## 2022-03-21 ENCOUNTER — PATIENT MESSAGE (OUTPATIENT)
Dept: ADMINISTRATIVE | Facility: HOSPITAL | Age: 67
End: 2022-03-21
Payer: MEDICARE

## 2022-03-23 ENCOUNTER — CLINICAL SUPPORT (OUTPATIENT)
Dept: REHABILITATION | Facility: HOSPITAL | Age: 67
End: 2022-03-23
Attending: FAMILY MEDICINE
Payer: MEDICARE

## 2022-03-23 DIAGNOSIS — M25.561 CHRONIC PAIN OF RIGHT KNEE: Primary | ICD-10-CM

## 2022-03-23 DIAGNOSIS — Z74.09 IMPAIRED FUNCTIONAL MOBILITY AND ACTIVITY TOLERANCE: ICD-10-CM

## 2022-03-23 DIAGNOSIS — G89.29 CHRONIC PAIN OF RIGHT KNEE: Primary | ICD-10-CM

## 2022-03-23 PROCEDURE — 97110 THERAPEUTIC EXERCISES: CPT | Mod: PN,CQ

## 2022-03-23 NOTE — PROGRESS NOTES
"[facility]   Physical Therapy Treatment Note     Name: Lynne Lester  Clinic Number: [id number]    Therapy Diagnosis:   Encounter Diagnoses   Name Primary?    Chronic pain of right knee Yes    Impaired functional mobility and activity tolerance      Physician: Tyler Moreno MD    Visit Date: 3/23/2022    Physician Orders: PT Eval and Treat   Medical Diagnosis from Referral: Chronic pain of R knee  Evaluation Date: 2/23/2022  Authorization Period Expiration: 12/31/2022  Plan of Care Expiration: 4/8/2022  Progress Note Due: 3/23/2022  Visit # / Visits authorized: 7/ 12   FOTO: Knee Survey completed 3/11/2022 46% residual deficit   Next survey due week of 3/28/2022     Precautions: Standard      PTA Visit #: 2/5     Time In: 12:00 pm  Time Out: 12:40 pm  Total Billable Time: 38 minutes    SUBJECTIVE     Pt reports: recovered better after last treatment and was sore only one day.  She was somewhat compliant with home exercise program.  Response to previous treatment: Felt okay  Functional change: increased activity tolerance    Pain: 1/10  Location: right knee      OBJECTIVE     Objective Measures updated at progress report unless specified.     Treatment     Lynne received the treatments listed below:      therapeutic exercises to develop strength, endurance and flexibility for 38 minutes including    Nustep level 4 x 10 min   Heel Cord stretch on wedge 3 x 30 sec    Heel Raises x 15   Forward Step Up 4" step x 10 each  bilateral   Closed chain TKE 4" step x10 ea bilateral   Mini squat with glut med bias x10   LAQ x10    Seated hamstring stretch 3 x 30 sec   QS x 2 min   Bridges 2 x 10     SLR 2 x 10   SAQ x 2 min grey bolster    Ball squeeze x 2 min   Supine Hip abd w/medium theraloop 2 x 10      Patient Education and Home Exercises     Home Exercises Provided and Patient Education Provided     Education provided:   - Added closed chain TKE, squat, and LAQ    Written Home " Exercises Provided: Patient instructed to cont prior HEP. Illustrated instructions to be issued at subsequent visit. Exercises were reviewed and Lynne was able to demonstrate them prior to the end of the session.  Lynne demonstrated good  understanding of the education provided. See EMR under Patient Instructions for exercises provided during therapy sessions     ASSESSMENT   Patient did well with the exercises and was only sore for one day after last therapy.  Patient still has difficulty with the last few SLR's and squats due to fatigue.  Lynne Is progressing well towards her goals.   Pt prognosis is Fair.     Pt will continue to benefit from skilled outpatient physical therapy to address the deficits listed in the problem list box on initial evaluation, provide pt/family education and to maximize pt's level of independence in the home and community environment.     Pt's spiritual, cultural and educational needs considered and pt agreeable to plan of care and goals.     Anticipated barriers to physical therapy: None    Goals:   Short Term Goals:               1) Facilitate improved LE flexibility Progressing              2) Facilitate improved R patellar mobility Progressing              3) Decreased R knee tenderness to minimal Progressing              4) Decrease swelling of R knee by 1/2 cm @ each affected level Ongoing     Long Term Goals:                1) Patient will consistently walk > 30 min at a time without increased knee pain Progressing              2) Patient will consistently navigate 1 flight of steps utilizing reciprocating gait pattern with minimal increase in knee pain Ongoing              3) Patient will consistently sleep > 6 hours without interruption by knee pain Progressing              4) Patient will consistently perform sit <> stand transfers safely with minimal UE support Progressing              5) Improve functional deficit to < 30% as indicated by FOTO knee survey Slight  progress              6) Patient will be independent in complete HEP Progressing     PLAN     Patellar mobilization, soft tissue mobilization to reduce tenderness.  Increase height and reps on forward step ups.      Vinay Amador, PTA

## 2022-03-30 ENCOUNTER — CLINICAL SUPPORT (OUTPATIENT)
Dept: REHABILITATION | Facility: HOSPITAL | Age: 67
End: 2022-03-30
Attending: FAMILY MEDICINE
Payer: MEDICARE

## 2022-03-30 DIAGNOSIS — G89.29 CHRONIC PAIN OF RIGHT KNEE: ICD-10-CM

## 2022-03-30 DIAGNOSIS — M25.561 CHRONIC PAIN OF RIGHT KNEE: ICD-10-CM

## 2022-03-30 DIAGNOSIS — Z74.09 IMPAIRED FUNCTIONAL MOBILITY AND ACTIVITY TOLERANCE: Primary | ICD-10-CM

## 2022-03-30 PROCEDURE — 97110 THERAPEUTIC EXERCISES: CPT | Mod: PN,CQ

## 2022-03-30 NOTE — PROGRESS NOTES
"[facility]   Physical Therapy Treatment Note     Name: Lynne Lester  Clinic Number: [id number]    Therapy Diagnosis:   Encounter Diagnoses   Name Primary?    Chronic pain of right knee     Impaired functional mobility and activity tolerance Yes     Physician: Tyler Moreno MD    Visit Date: 3/30/2022    Physician Orders: PT Eval and Treat   Medical Diagnosis from Referral: Chronic pain of R knee  Evaluation Date: 2/23/2022  Authorization Period Expiration: 12/31/2022  Plan of Care Expiration: 4/8/2022  Progress Note Due: 3/23/2022  Visit # / Visits authorized: 8 / 12   FOTO: Knee Survey completed 3/11/2022 46% residual deficit   Next survey due week of 3/28/2022     Precautions: Standard      PTA Visit #: 3/5     Time In: 1:20 PM  Time Out: 2:10 PM  Total Billable Time: 40 minutes    SUBJECTIVE     Pt reports: I slipped and fell on to the floor in my kitchen about four days ago, no apparent injuries.  She was somewhat compliant with home exercise program.  Response to previous treatment: Felt okay  Functional change: increased activity tolerance    Pain: 1/10  Location: right knee      OBJECTIVE     Objective Measures updated at progress report unless specified.     Treatment     Lynne received the treatments listed below:      therapeutic exercises to develop strength, endurance and flexibility for 40 minutes including    Nustep level 4 x 10 min   Heel Cord stretch on wedge 3 x 30 sec    Heel Raises x 15   Forward Step Up 6" step x 10 each  bilateral   Closed chain TKE 6" step x10 ea bilateral   Mini squat with glut med bias x10   LAQ x20    Seated hamstring stretch 3 x 30 sec   QS x 2 min   Bridges 2 x 10     SLR 2 x 10   SAQ x 2 min grey bolster    Ball squeeze x 2 min   Supine Hip abd w/medium theraloop 2 x 10      Patient Education and Home Exercises     Home Exercises Provided and Patient Education Provided     Education provided:   - Added closed chain TKE, squat, " and LAQ    Written Home Exercises Provided: Patient instructed to cont prior HEP. Illustrated instructions to be issued at subsequent visit. Exercises were reviewed and Lynne was able to demonstrate them prior to the end of the session.  Lnyne demonstrated good  understanding of the education provided. See EMR under Patient Instructions for exercises provided during therapy sessions     ASSESSMENT   Patient did well with the exercises; able to tolerate increased height on step-ups  Lynne Is progressing well towards her goals.   Pt prognosis is Fair.     Pt will continue to benefit from skilled outpatient physical therapy to address the deficits listed in the problem list box on initial evaluation, provide pt/family education and to maximize pt's level of independence in the home and community environment.     Pt's spiritual, cultural and educational needs considered and pt agreeable to plan of care and goals.     Anticipated barriers to physical therapy: None    Goals:   Short Term Goals:               1) Facilitate improved LE flexibility Progressing              2) Facilitate improved R patellar mobility Progressing              3) Decreased R knee tenderness to minimal Progressing              4) Decrease swelling of R knee by 1/2 cm @ each affected level Ongoing     Long Term Goals:                1) Patient will consistently walk > 30 min at a time without increased knee pain Progressing              2) Patient will consistently navigate 1 flight of steps utilizing reciprocating gait pattern with minimal increase in knee pain Ongoing              3) Patient will consistently sleep > 6 hours without interruption by knee pain Progressing              4) Patient will consistently perform sit <> stand transfers safely with minimal UE support Progressing              5) Improve functional deficit to < 30% as indicated by FOTO knee survey Slight progress              6) Patient will be independent in complete  HEP Progressing     PLAN     Patellar mobilization, soft tissue mobilization to reduce tenderness.  Increase height and reps on forward step ups.      Jonathan Favre, PTA

## 2022-03-31 DIAGNOSIS — M70.51 PES ANSERINUS BURSITIS OF RIGHT KNEE: Primary | ICD-10-CM

## 2022-04-01 ENCOUNTER — TELEPHONE (OUTPATIENT)
Dept: ORTHOPEDICS | Facility: CLINIC | Age: 67
End: 2022-04-01
Payer: MEDICARE

## 2022-04-01 ENCOUNTER — PATIENT MESSAGE (OUTPATIENT)
Dept: FAMILY MEDICINE | Facility: CLINIC | Age: 67
End: 2022-04-01
Payer: MEDICARE

## 2022-04-01 RX ORDER — MELOXICAM 15 MG/1
TABLET ORAL
Qty: 30 TABLET | Refills: 1 | Status: SHIPPED | OUTPATIENT
Start: 2022-04-01 | End: 2022-05-25 | Stop reason: SDUPTHER

## 2022-04-01 NOTE — TELEPHONE ENCOUNTER
I called patient. Had to reschedule due to xray machine being down. Patient reschedule for 4/22/22 at 3pm

## 2022-04-04 ENCOUNTER — CLINICAL SUPPORT (OUTPATIENT)
Dept: REHABILITATION | Facility: HOSPITAL | Age: 67
End: 2022-04-04
Attending: FAMILY MEDICINE
Payer: MEDICARE

## 2022-04-04 ENCOUNTER — PATIENT MESSAGE (OUTPATIENT)
Dept: ADMINISTRATIVE | Facility: HOSPITAL | Age: 67
End: 2022-04-04
Payer: MEDICARE

## 2022-04-04 DIAGNOSIS — G89.29 CHRONIC PAIN OF RIGHT KNEE: Primary | ICD-10-CM

## 2022-04-04 DIAGNOSIS — M25.561 CHRONIC PAIN OF RIGHT KNEE: Primary | ICD-10-CM

## 2022-04-04 DIAGNOSIS — Z74.09 IMPAIRED FUNCTIONAL MOBILITY AND ACTIVITY TOLERANCE: ICD-10-CM

## 2022-04-04 PROCEDURE — 97110 THERAPEUTIC EXERCISES: CPT | Mod: PN,CQ

## 2022-04-04 NOTE — PROGRESS NOTES
"[facility]   Physical Therapy Treatment Note     Name: Lynne Lester  Clinic Number: [id number]    Therapy Diagnosis:   Encounter Diagnoses   Name Primary?    Chronic pain of right knee Yes    Impaired functional mobility and activity tolerance      Physician: Tyler Moreno MD    Visit Date: 4/4/2022    Physician Orders: PT Eval and Treat   Medical Diagnosis from Referral: Chronic pain of R knee  Evaluation Date: 2/23/2022  Authorization Period Expiration: 12/31/2022  Plan of Care Expiration: 4/8/2022  Progress Note Due: 3/23/2022  Visit # / Visits authorized: 9 / 12   FOTO: Knee Survey completed 3/11/2022 46% residual deficit   Next survey due week of 3/28/2022     Precautions: Standard      PTA Visit #: 4/5     Time In: 12:00 PM  Time Out: 12:40 PM  Total Billable Time: 40 minutes    SUBJECTIVE     Pt reports: feeling better today.  She was somewhat compliant with home exercise program.  Response to previous treatment: Felt okay  Functional change: increased activity tolerance    Pain: 1/10  Location: right knee      OBJECTIVE     Objective Measures updated at progress report unless specified.     Treatment     Lynne received the treatments listed below:      therapeutic exercises to develop strength, endurance and flexibility for 40 minutes including    Nustep level 4 x 10 min   Heel Cord stretch on wedge 3 x 30 sec    Heel Raises x 15   Forward Step Up 6" step x 10 each  bilateral   Closed chain TKE 6" step x10 ea bilateral   Mini squat with glut med bias x10   LAQ x20    Seated hamstring stretch 3 x 30 sec   QS x 2 min   Bridges 2 x 10     SLR 2 x 10   SAQ x 2 min grey bolster    Ball squeeze x 2 min   Supine Hip abd w/medium theraloop 2 x 10      Patient Education and Home Exercises     Home Exercises Provided and Patient Education Provided     Education provided:   - Added closed chain TKE, squat, and LAQ    Written Home Exercises Provided: Patient instructed to " cont prior HEP. Illustrated instructions to be issued at subsequent visit. Exercises were reviewed and Lynne was able to demonstrate them prior to the end of the session.  Lynne demonstrated good  understanding of the education provided. See EMR under Patient Instructions for exercises provided during therapy sessions     ASSESSMENT   Patient did well with the exercises and no increase in pain noted during treatment.  Lynne Is progressing well towards her goals.   Pt prognosis is Fair.     Pt will continue to benefit from skilled outpatient physical therapy to address the deficits listed in the problem list box on initial evaluation, provide pt/family education and to maximize pt's level of independence in the home and community environment.     Pt's spiritual, cultural and educational needs considered and pt agreeable to plan of care and goals.     Anticipated barriers to physical therapy: None    Goals:   Short Term Goals:               1) Facilitate improved LE flexibility Progressing              2) Facilitate improved R patellar mobility Progressing              3) Decreased R knee tenderness to minimal Progressing              4) Decrease swelling of R knee by 1/2 cm @ each affected level Ongoing     Long Term Goals:                1) Patient will consistently walk > 30 min at a time without increased knee pain Progressing              2) Patient will consistently navigate 1 flight of steps utilizing reciprocating gait pattern with minimal increase in knee pain Ongoing              3) Patient will consistently sleep > 6 hours without interruption by knee pain Progressing              4) Patient will consistently perform sit <> stand transfers safely with minimal UE support Progressing              5) Improve functional deficit to < 30% as indicated by FOTO knee survey Slight progress              6) Patient will be independent in complete HEP Progressing     PLAN     Patellar mobilization, soft tissue  mobilization to reduce tenderness.  Increase height and reps on forward step ups.      Vinay Amador, PTA

## 2022-04-08 ENCOUNTER — CLINICAL SUPPORT (OUTPATIENT)
Dept: REHABILITATION | Facility: HOSPITAL | Age: 67
End: 2022-04-08
Attending: FAMILY MEDICINE
Payer: MEDICARE

## 2022-04-08 DIAGNOSIS — G89.29 CHRONIC PAIN OF RIGHT KNEE: ICD-10-CM

## 2022-04-08 DIAGNOSIS — M25.561 CHRONIC PAIN OF RIGHT KNEE: ICD-10-CM

## 2022-04-08 DIAGNOSIS — Z74.09 IMPAIRED FUNCTIONAL MOBILITY AND ACTIVITY TOLERANCE: Primary | ICD-10-CM

## 2022-04-08 PROCEDURE — 97110 THERAPEUTIC EXERCISES: CPT | Mod: PN,CQ

## 2022-04-08 NOTE — PROGRESS NOTES
"[facility]   Physical Therapy Treatment Note     Name: Lynne Lester  Clinic Number: [id number]    Therapy Diagnosis:   Encounter Diagnoses   Name Primary?    Chronic pain of right knee     Impaired functional mobility and activity tolerance Yes     Physician: Tyler Moreno MD    Visit Date: 4/8/2022    Physician Orders: PT Eval and Treat   Medical Diagnosis from Referral: Chronic pain of R knee  Evaluation Date: 2/23/2022  Authorization Period Expiration: 12/31/2022  Plan of Care Expiration: 4/8/2022  Progress Note Due: 3/23/2022  Visit # / Visits authorized: 10 / 12   FOTO: Knee Survey completed 3/11/2022 46% residual deficit   Next survey due week of 3/28/2022     Precautions: Standard      PTA Visit #: 5/5     Time In: 12:30 PM  Time Out: 1:15 PM  Total Billable Time: 40 minutes    SUBJECTIVE     Pt reports: No new c/o's.   She was somewhat compliant with home exercise program.  Response to previous treatment: Felt okay  Functional change: increased activity tolerance    Pain: 0/10  Location: right knee      OBJECTIVE     Objective Measures updated at progress report unless specified.     Treatment     Lynne received the treatments listed below:      therapeutic exercises to develop strength, endurance and flexibility for 40 minutes including    Nustep level 4 x 12 min   Heel Cord stretch on wedge 3 x 30 sec    Heel Raises x 15   Forward Step Up 6" step x 10 each  bilateral   Closed chain TKE 6" step x10 ea bilateral   Mini squat with glut med bias x10   LAQ x20    Seated hamstring stretch 3 x 30 sec   QS x 2 min   Bridges 2 x 10     SLR 2 x 10   SAQ x 2 min grey bolster    Ball squeeze x 2 min   Supine Hip abd w/medium theraloop 2 x 10      Patient Education and Home Exercises     Home Exercises Provided and Patient Education Provided     Education provided:   - Added closed chain TKE, squat, and LAQ    Written Home Exercises Provided: Patient instructed to cont prior " HEP. Illustrated instructions to be issued at subsequent visit. Exercises were reviewed and Lynne was able to demonstrate them prior to the end of the session.  Lynne demonstrated good  understanding of the education provided. See EMR under Patient Instructions for exercises provided during therapy sessions     ASSESSMENT   Patient did well with the exercises and no increase in pain noted during treatment.  Lynne Is progressing well towards her goals.   Pt prognosis is Fair.     Pt will continue to benefit from skilled outpatient physical therapy to address the deficits listed in the problem list box on initial evaluation, provide pt/family education and to maximize pt's level of independence in the home and community environment.     Pt's spiritual, cultural and educational needs considered and pt agreeable to plan of care and goals.     Anticipated barriers to physical therapy: None    Goals:   Short Term Goals:               1) Facilitate improved LE flexibility Progressing              2) Facilitate improved R patellar mobility Progressing              3) Decreased R knee tenderness to minimal Progressing              4) Decrease swelling of R knee by 1/2 cm @ each affected level Ongoing     Long Term Goals:                1) Patient will consistently walk > 30 min at a time without increased knee pain Progressing              2) Patient will consistently navigate 1 flight of steps utilizing reciprocating gait pattern with minimal increase in knee pain Ongoing              3) Patient will consistently sleep > 6 hours without interruption by knee pain Progressing              4) Patient will consistently perform sit <> stand transfers safely with minimal UE support Progressing              5) Improve functional deficit to < 30% as indicated by FOTO knee survey Slight progress              6) Patient will be independent in complete HEP Progressing     PLAN     Patellar mobilization, soft tissue mobilization  to reduce tenderness.  Increase height and reps on forward step ups.      Jonathan Favre, PTA

## 2022-04-14 ENCOUNTER — CLINICAL SUPPORT (OUTPATIENT)
Dept: REHABILITATION | Facility: HOSPITAL | Age: 67
End: 2022-04-14
Attending: FAMILY MEDICINE
Payer: MEDICARE

## 2022-04-14 DIAGNOSIS — G89.29 CHRONIC PAIN OF RIGHT KNEE: Primary | ICD-10-CM

## 2022-04-14 DIAGNOSIS — Z74.09 IMPAIRED FUNCTIONAL MOBILITY AND ACTIVITY TOLERANCE: ICD-10-CM

## 2022-04-14 DIAGNOSIS — M25.561 CHRONIC PAIN OF RIGHT KNEE: Primary | ICD-10-CM

## 2022-04-14 PROCEDURE — 97110 THERAPEUTIC EXERCISES: CPT | Mod: PN

## 2022-04-14 NOTE — PROGRESS NOTES
"[facility]   Physical Therapy Treatment Note     Name: Lynne Lester  Clinic Number: [id number]    Therapy Diagnosis:   Encounter Diagnoses   Name Primary?    Chronic pain of right knee Yes    Impaired functional mobility and activity tolerance      Physician: Tyler Moreno MD    Visit Date: 4/14/2022    Physician Orders: PT Eval and Treat   Medical Diagnosis from Referral: Chronic pain of R knee  Evaluation Date: 2/23/2022  Authorization Period Expiration: 12/31/2022  Plan of Care Expiration: 4/8/2022  Progress Note Due: 3/23/2022  Visit # / Visits authorized: 10 / 12   FOTO: Knee Survey completed 3/11/2022 46% residual deficit   Next survey due week of 3/28/2022     Precautions: Standard      PTA Visit #: 5/5     Time In: 12:30 PM  Time Out: 1:15 PM  Total Billable Time: 40 minutes    SUBJECTIVE     Pt reports: No new c/o's.   She was somewhat compliant with home exercise program.  Response to previous treatment: Felt okay  Functional change: increased activity tolerance    Pain: 0/10  Location: right knee      OBJECTIVE     Objective Measures updated at progress report unless specified.     Treatment     Lynne received the treatments listed below:      therapeutic exercises to develop strength, endurance and flexibility for 40 minutes including    Nustep level 4 x 12 min   Heel Cord stretch on wedge 3 x 30 sec    Heel Raises x 15   Forward Step Up 6" step x 10 each  bilateral   Closed chain TKE 6" step x10 ea bilateral   Mini squat with glut med bias x10   LAQ x20    Seated hamstring stretch 3 x 30 sec   QS x 2 min   Bridges 2 x 10     SLR 2 x 10   SAQ x 2 min grey bolster    Ball squeeze x 2 min   Supine Hip abd w/medium theraloop 2 x 10      Patient Education and Home Exercises     Home Exercises Provided and Patient Education Provided     Education provided:   - Added closed chain TKE, squat, and LAQ    Written Home Exercises Provided: Patient instructed to cont " prior HEP. Illustrated instructions to be issued at subsequent visit. Exercises were reviewed and Lynne was able to demonstrate them prior to the end of the session.  Lynne demonstrated good  understanding of the education provided. See EMR under Patient Instructions for exercises provided during therapy sessions     ASSESSMENT   Patient did well with the exercises and no increase in pain noted during treatment.  Lynne Is progressing well towards her goals.   Pt prognosis is Fair.     Pt will continue to benefit from skilled outpatient physical therapy to address the deficits listed in the problem list box on initial evaluation, provide pt/family education and to maximize pt's level of independence in the home and community environment.     Pt's spiritual, cultural and educational needs considered and pt agreeable to plan of care and goals.     Anticipated barriers to physical therapy: None    Goals:   Short Term Goals:               1) Facilitate improved LE flexibility Progressing              2) Facilitate improved R patellar mobility Progressing              3) Decreased R knee tenderness to minimal Progressing              4) Decrease swelling of R knee by 1/2 cm @ each affected level Ongoing     Long Term Goals:                1) Patient will consistently walk > 30 min at a time without increased knee pain Progressing              2) Patient will consistently navigate 1 flight of steps utilizing reciprocating gait pattern with minimal increase in knee pain Ongoing              3) Patient will consistently sleep > 6 hours without interruption by knee pain Progressing              4) Patient will consistently perform sit <> stand transfers safely with minimal UE support Progressing              5) Improve functional deficit to < 30% as indicated by FOTO knee survey Slight progress              6) Patient will be independent in complete HEP Progressing     PLAN     Patellar mobilization, soft tissue  mobilization to reduce tenderness.  Increase height and reps on forward step ups.      Parish Jaime, PT

## 2022-04-14 NOTE — PLAN OF CARE
"[facility]  Physical Therapy Plan of Care Note    Name: Lynne Lester  Clinic Number: [id number]    Therapy Diagnosis:   Encounter Diagnoses   Name Primary?    Chronic pain of right knee Yes    Impaired functional mobility and activity tolerance      Physician: Tyler Moreno MD    Visit Date: 4/14/2022    Physician Orders: PT Eval and Treat   Medical Diagnosis from Referral: Chronic pain of R knee  Evaluation Date: 4/14/2022  Authorization Period Expiration: 12/31/22   Plan of Care Expiration: 4/08/22  Progress Note Due: 3/23/22   Visit # / Visits authorized: 11/ 12  FOTO: 3/3    Precautions: Standard  Functional Level Prior to Evaluation:  Impaired    SUBJECTIVE     Update: Pt reports she has seen improvement with PT. Pt still feels like she has weakness present in her thighs. Pt says she is not having as much trouble sleeping due to pain. Pt says stairs are still very difficult for her to manage.    Pain: 2/10 currently    OBJECTIVE     Update: Knee ROM  R: Flex 120*  Ext  15*  L: Flex 135*  Ext  5*    Nustep level 4 x 12 min              Heel Cord stretch on wedge 3 x 30 sec               Heel Raises x 15              Forward Step Up 6" step x 10 each  bilateral              Closed chain TKE 6" step x10 ea bilateral              Mini squat with glut med bias x10              LAQ x20                       Seated hamstring stretch 3 x 30 sec              QS x 2 min              Bridges 2 x 10    SLR 2 x 10              SAQ x 2 min grey bolster               Ball squeeze x 2 min              Supine Hip abd w/medium theraloop 2 x 10      ASSESSMENT     Update: Pt is showing good progress towards goals at this time. Pt would benefit from continued therapy to address deficits especially strength and functional activity tolerance.      GOALS  Short Term Goals:               1) Facilitate improved LE flexibility 80% MET              2) Facilitate improved R patellar mobility " MET              3) Decreased R knee tenderness to minimal Partially MET              4) Decrease swelling of R knee by 1/2 cm @ each affected level Not MET     Long Term Goals:                1) Patient will consistently walk > 30 min at a time without increased knee pain (80% MET)              2) Patient will consistently navigate 1 flight of steps utilizing reciprocating gait pattern with minimal increase in knee pain NOT MET              3) Patient will consistently sleep > 6 hours without interruption by knee pain 75% MET              4) Patient will consistently perform sit <> stand transfers safely with minimal UE support Progressing              5) Improve functional deficit to < 30% as indicated by FOTO knee survey Slight progress (43%)              6) Patient will be independent in complete HEP Progressing    7) NEW GOAL UPDATED 4/14  Pt reduce 10x STS time from 34 sec to 26 sec to show improvements in functional strength.       PLAN     Updated Certification Period: 4/14/22 to 6/30/22   Recommended Treatment Plan: 2 times per week for 8 weeks:  Gait Training, Manual Therapy, Neuromuscular Re-ed, Patient Education, Therapeutic Activities and Therapeutic Exercise  Other Recommendations: N/A    Parish Jaime, PT    I CERTIFY THE NEED FOR THESE SERVICES FURNISHED UNDER THIS PLAN OF TREATMENT AND WHILE UNDER MY CARE  Physician's comments:      Physician's Signature: ___________________________________________________

## 2022-04-20 ENCOUNTER — CLINICAL SUPPORT (OUTPATIENT)
Dept: REHABILITATION | Facility: HOSPITAL | Age: 67
End: 2022-04-20
Attending: FAMILY MEDICINE
Payer: MEDICARE

## 2022-04-20 DIAGNOSIS — M25.561 CHRONIC PAIN OF RIGHT KNEE: Primary | ICD-10-CM

## 2022-04-20 DIAGNOSIS — Z74.09 IMPAIRED FUNCTIONAL MOBILITY AND ACTIVITY TOLERANCE: ICD-10-CM

## 2022-04-20 DIAGNOSIS — G89.29 CHRONIC PAIN OF RIGHT KNEE: Primary | ICD-10-CM

## 2022-04-20 PROCEDURE — 97110 THERAPEUTIC EXERCISES: CPT | Mod: PN

## 2022-04-20 NOTE — PROGRESS NOTES
"[facility]   Physical Therapy Treatment Note     Name: Lynne Lester  Clinic Number: [id number]    Therapy Diagnosis:   Encounter Diagnoses   Name Primary?    Chronic pain of right knee Yes    Impaired functional mobility and activity tolerance      Physician: Tyler Moreno MD    Visit Date: 4/20/2022    Physician Orders: PT Eval and Treat   Medical Diagnosis from Referral: Chronic pain of R knee  Evaluation Date: 4/14/2022  Authorization Period Expiration: 12/31/22   Plan of Care Expiration: 6/30/22  Progress Note Due: 6/30/22   Visit # / Visits authorized: 12/ Precautions: Standard      PTA Visit #: 0/5     Time In: 1000  Time Out: 1045  Total Billable Time: 45 minutes    SUBJECTIVE     Pt reports: No rain so her knees are doing good today.  She was somewhat compliant with home exercise program.  Response to previous treatment: Felt okay  Functional change: increased activity tolerance    Pain: 0/10  Location: right knee      OBJECTIVE     Objective Measures updated at progress report unless specified.     Treatment     Lynne received the treatments listed below:      therapeutic exercises to develop strength, endurance and flexibility for 40 minutes including    Nustep level 4 x 12 min   Heel Cord stretch on wedge 3 x 30 sec    Heel Raises x 15   Forward Step Up 6" step x 10 each  bilateral   Closed chain TKE 6" step x10 ea bilateral   Mini squat with glut med bias x10   Standing 3 way hip 15x B    LAQ x20    Seated hamstring stretch 3 x 30 sec  (NT)   QS x 2 min (NT)   Bridges 2 x 10     SLR 2 x 10   SAQ x 2 min grey bolster (NT)   Ball squeeze x 2 min   Supine Hip abd w/medium theraloop 2 x 10      Patient Education and Home Exercises     Home Exercises Provided and Patient Education Provided     Education provided:   - Added closed chain TKE, squat, and LAQ    Written Home Exercises Provided: Patient instructed to cont prior HEP. Illustrated instructions to be " issued at subsequent visit. Exercises were reviewed and Lynne was able to demonstrate them prior to the end of the session.  Lynne demonstrated good  understanding of the education provided. See EMR under Patient Instructions for exercises provided during therapy sessions     ASSESSMENT   Patient did well with the exercises and no increase in pain noted during treatment.  Lynne Is progressing well towards her goals.   Pt prognosis is Fair.     Pt will continue to benefit from skilled outpatient physical therapy to address the deficits listed in the problem list box on initial evaluation, provide pt/family education and to maximize pt's level of independence in the home and community environment.     Pt's spiritual, cultural and educational needs considered and pt agreeable to plan of care and goals.     Anticipated barriers to physical therapy: None    GOALS  Short Term Goals:               1) Facilitate improved LE flexibility 80% MET               2) Facilitate improved R patellar mobility MET              3) Decreased R knee tenderness to minimal Partially MET              4) Decrease swelling of R knee by 1/2 cm @ each affected level Not MET     Long Term Goals:                1) Patient will consistently walk > 30 min at a time without increased knee pain (80% MET)              2) Patient will consistently navigate 1 flight of steps utilizing reciprocating gait pattern with minimal increase in knee pain NOT MET              3) Patient will consistently sleep > 6 hours without interruption by knee pain 75% MET              4) Patient will consistently perform sit <> stand transfers safely with minimal UE support Progressing              5) Improve functional deficit to < 30% as indicated by FOTO knee survey Slight progress (43%)              6) Patient will be independent in complete HEP Progressing               7) NEW GOAL UPDATED 4/14  Pt reduce 10x STS time from 34 sec to 26 sec to show improvements in  functional strength.    PLAN     Patellar mobilization, soft tissue mobilization to reduce tenderness.  Increase height and reps on forward step ups.      Parish Jaime, PT

## 2022-04-21 ENCOUNTER — TELEPHONE (OUTPATIENT)
Dept: ORTHOPEDICS | Facility: CLINIC | Age: 67
End: 2022-04-21
Payer: MEDICARE

## 2022-04-21 NOTE — TELEPHONE ENCOUNTER
LVM informing patient that her appointment for 4/22/22 is being rescheduled due to provider having a family emergency. Informed patient of new appointment. Advised patient to reach out with any questions.

## 2022-04-25 ENCOUNTER — CLINICAL SUPPORT (OUTPATIENT)
Dept: REHABILITATION | Facility: HOSPITAL | Age: 67
End: 2022-04-25
Attending: FAMILY MEDICINE
Payer: MEDICARE

## 2022-04-25 DIAGNOSIS — G89.29 CHRONIC PAIN OF RIGHT KNEE: Primary | ICD-10-CM

## 2022-04-25 DIAGNOSIS — M25.561 CHRONIC PAIN OF RIGHT KNEE: Primary | ICD-10-CM

## 2022-04-25 DIAGNOSIS — Z74.09 IMPAIRED FUNCTIONAL MOBILITY AND ACTIVITY TOLERANCE: ICD-10-CM

## 2022-04-25 PROCEDURE — 97110 THERAPEUTIC EXERCISES: CPT | Mod: PN,CQ

## 2022-04-25 NOTE — PROGRESS NOTES
"[facility]   Physical Therapy Treatment Note     Name: Lynne Lester  Clinic Number: [id number]    Therapy Diagnosis:   Encounter Diagnoses   Name Primary?    Chronic pain of right knee Yes    Impaired functional mobility and activity tolerance      Physician: Tyler Moreno MD    Visit Date: 4/25/2022    Physician Orders: PT Eval and Treat   Medical Diagnosis from Referral: Chronic pain of R knee  Evaluation Date: 4/14/2022  Authorization Period Expiration: 12/31/22   Plan of Care Expiration: 6/30/22  Progress Note Due: 6/30/22   Visit # / Visits authorized: 12/ Precautions: Standard      PTA Visit #: 0/5     Time In: 11:10 am  Time Out: 11:50 am  Total Billable Time: 40 minutes    SUBJECTIVE     Pt reports: feeling good today.  She was somewhat compliant with home exercise program.  Response to previous treatment: Felt okay  Functional change: increased activity tolerance    Pain: 0/10  Location: right knee      OBJECTIVE     Objective Measures updated at progress report unless specified.     Treatment     Lynne received the treatments listed below:      therapeutic exercises to develop strength, endurance and flexibility for 40 minutes including    Nustep level 4 x 12 min   Heel Cord stretch on wedge 3 x 30 sec    Heel Raises x 15   Forward Step Up 6" step x 15 each  bilateral   Closed chain TKE 6" step x10 ea bilateral   Mini squat with glut med bias x10   Standing 3 way hip 15x B    LAQ x20    Seated hamstring stretch 3 x 30 sec     QS x 2 min (DNP)   Bridges 2 x 10     SLR 2 x 10   SAQ x 2 min grey bolster (DNP)   Ball squeeze x 2 min   Supine Hip abd w/medium theraloop 2 x 10 DNP      Patient Education and Home Exercises     Home Exercises Provided and Patient Education Provided     Education provided:   - Added closed chain TKE, squat, and LAQ    Written Home Exercises Provided: Patient instructed to cont prior HEP. Illustrated instructions to be issued at " subsequent visit. Exercises were reviewed and Lynne was able to demonstrate them prior to the end of the session.  Lynne demonstrated good  understanding of the education provided. See EMR under Patient Instructions for exercises provided during therapy sessions     ASSESSMENT   Patient is improving with strength and endurance.  She has seen an improvement in her ADL.  Lynne Is progressing well towards her goals.   Pt prognosis is Fair.     Pt will continue to benefit from skilled outpatient physical therapy to address the deficits listed in the problem list box on initial evaluation, provide pt/family education and to maximize pt's level of independence in the home and community environment.     Pt's spiritual, cultural and educational needs considered and pt agreeable to plan of care and goals.     Anticipated barriers to physical therapy: None    GOALS  Short Term Goals:               1) Facilitate improved LE flexibility 80% MET               2) Facilitate improved R patellar mobility MET              3) Decreased R knee tenderness to minimal Partially MET              4) Decrease swelling of R knee by 1/2 cm @ each affected level Not MET     Long Term Goals:                1) Patient will consistently walk > 30 min at a time without increased knee pain (80% MET)              2) Patient will consistently navigate 1 flight of steps utilizing reciprocating gait pattern with minimal increase in knee pain NOT MET              3) Patient will consistently sleep > 6 hours without interruption by knee pain 75% MET              4) Patient will consistently perform sit <> stand transfers safely with minimal UE support Progressing              5) Improve functional deficit to < 30% as indicated by FOTO knee survey Slight progress (43%)              6) Patient will be independent in complete HEP Progressing               7) NEW GOAL UPDATED 4/14  Pt reduce 10x STS time from 34 sec to 26 sec to show improvements in  functional strength.    PLAN     Patellar mobilization, soft tissue mobilization to reduce tenderness.  Increase height and reps on forward step ups.      Vinay Amador, PTA

## 2022-04-27 ENCOUNTER — CLINICAL SUPPORT (OUTPATIENT)
Dept: REHABILITATION | Facility: HOSPITAL | Age: 67
End: 2022-04-27
Attending: FAMILY MEDICINE
Payer: MEDICARE

## 2022-04-27 DIAGNOSIS — M25.561 CHRONIC PAIN OF RIGHT KNEE: Primary | ICD-10-CM

## 2022-04-27 DIAGNOSIS — G89.29 CHRONIC PAIN OF RIGHT KNEE: Primary | ICD-10-CM

## 2022-04-27 DIAGNOSIS — Z74.09 IMPAIRED FUNCTIONAL MOBILITY AND ACTIVITY TOLERANCE: ICD-10-CM

## 2022-04-27 PROCEDURE — 97110 THERAPEUTIC EXERCISES: CPT | Mod: PN

## 2022-04-27 NOTE — PROGRESS NOTES
"[facility]   Physical Therapy Treatment Note     Name: Lynne Lester  Clinic Number: [id number]    Therapy Diagnosis:   Encounter Diagnoses   Name Primary?    Chronic pain of right knee Yes    Impaired functional mobility and activity tolerance      Physician: Tyler Moreno MD    Visit Date: 4/27/2022    Physician Orders: PT Eval and Treat   Medical Diagnosis from Referral: Chronic pain of R knee  Evaluation Date: 4/14/2022  Authorization Period Expiration: 12/31/22   Plan of Care Expiration: 6/30/22  Progress Note Due: 6/30/22   Visit # / Visits authorized: 15/    Precautions: Standard      PTA Visit #: 0/5     Time In: 1300  Time Out: 1340  Total Billable Time: 40 minutes    SUBJECTIVE     Pt reports: her knee is feeling good today.   She was somewhat compliant with home exercise program.  Response to previous treatment: Felt okay  Functional change: increased activity tolerance    Pain: 0/10  Location: right knee      OBJECTIVE     Objective Measures updated at progress report unless specified.     Treatment     Lynne received the treatments listed below:      therapeutic exercises to develop strength, endurance and flexibility for 40 minutes including    Nustep level 4 x 12 min   Heel Cord stretch on wedge 3 x 30 sec    Heel Raises x 15   Forward Step Up 6" step x 15 each  Bilateral    Closed chain TKE 6" step x10 ea bilateral   Mini squat with glut med bias x10    Standing 3 way hip 15x B    STS 2x10   LAQ x20    Seated hamstring stretch 3 x 30 sec     QS x 2 min (DNP)   Bridges 2 x 10     SLR 2 x 10   SAQ x 2 min grey bolster (DNP)   Ball squeeze x 2 min   Supine Hip abd w/medium theraloop 2 x 10 DNP      Patient Education and Home Exercises     Home Exercises Provided and Patient Education Provided     Education provided:   - Added closed chain TKE, squat, and LAQ    Written Home Exercises Provided: Patient instructed to cont prior HEP. Illustrated instructions to " be issued at subsequent visit. Exercises were reviewed and Lynne was able to demonstrate them prior to the end of the session.  Lynne demonstrated good  understanding of the education provided. See EMR under Patient Instructions for exercises provided during therapy sessions     ASSESSMENT   Pt continues to show improvements in activity tolerance. Pt had no pain during today's session.  Lynne Is progressing well towards her goals.   Pt prognosis is Fair.     Pt will continue to benefit from skilled outpatient physical therapy to address the deficits listed in the problem list box on initial evaluation, provide pt/family education and to maximize pt's level of independence in the home and community environment.     Pt's spiritual, cultural and educational needs considered and pt agreeable to plan of care and goals.     Anticipated barriers to physical therapy: None    GOALS  Short Term Goals:               1) Facilitate improved LE flexibility 80% MET               2) Facilitate improved R patellar mobility MET              3) Decreased R knee tenderness to minimal Partially MET              4) Decrease swelling of R knee by 1/2 cm @ each affected level Not MET     Long Term Goals:                1) Patient will consistently walk > 30 min at a time without increased knee pain (80% MET)              2) Patient will consistently navigate 1 flight of steps utilizing reciprocating gait pattern with minimal increase in knee pain NOT MET              3) Patient will consistently sleep > 6 hours without interruption by knee pain 75% MET              4) Patient will consistently perform sit <> stand transfers safely with minimal UE support Progressing              5) Improve functional deficit to < 30% as indicated by FOTO knee survey Slight progress (43%)              6) Patient will be independent in complete HEP Progressing               7) NEW GOAL UPDATED 4/14  Pt reduce 10x STS time from 34 sec to 26 sec to show  improvements in functional strength.    PLAN     Progress pt per POC.  Increase height and reps on forward step ups.      Parish Jaime, PT

## 2022-05-02 ENCOUNTER — TELEPHONE (OUTPATIENT)
Dept: ORTHOPEDICS | Facility: CLINIC | Age: 67
End: 2022-05-02
Payer: MEDICARE

## 2022-05-02 ENCOUNTER — CLINICAL SUPPORT (OUTPATIENT)
Dept: REHABILITATION | Facility: HOSPITAL | Age: 67
End: 2022-05-02
Attending: FAMILY MEDICINE
Payer: MEDICARE

## 2022-05-02 DIAGNOSIS — G89.29 CHRONIC PAIN OF RIGHT KNEE: Primary | ICD-10-CM

## 2022-05-02 DIAGNOSIS — M25.561 CHRONIC PAIN OF RIGHT KNEE: Primary | ICD-10-CM

## 2022-05-02 DIAGNOSIS — Z74.09 IMPAIRED FUNCTIONAL MOBILITY AND ACTIVITY TOLERANCE: ICD-10-CM

## 2022-05-02 PROCEDURE — 97110 THERAPEUTIC EXERCISES: CPT | Mod: PN,CQ

## 2022-05-02 NOTE — PROGRESS NOTES
"[facility]   Physical Therapy Treatment Note     Name: Lynne Lester  Clinic Number: [id number]    Therapy Diagnosis:   Encounter Diagnoses   Name Primary?    Chronic pain of right knee Yes    Impaired functional mobility and activity tolerance      Physician: Tyler Moreno MD    Visit Date: 5/2/2022    Physician Orders: PT Eval and Treat   Medical Diagnosis from Referral: Chronic pain of R knee  Evaluation Date: 4/14/2022  Authorization Period Expiration: 12/31/22   Plan of Care Expiration: 6/30/22  Progress Note Due: 6/30/22   Visit # / Visits authorized: 15  Precautions: Standard      PTA Visit #: 1/5     Time In: 10:25 am  Time Out: 11:05 am  Total Billable Time: 40 minutes    SUBJECTIVE     Pt reports: working Friday and Saturday and is tired today.  She was somewhat compliant with home exercise program.  Response to previous treatment: Felt okay  Functional change: increased activity tolerance    Pain: 0/10  Location: right knee      OBJECTIVE     Objective Measures updated at progress report unless specified.     Treatment     Lynne received the treatments listed below:      therapeutic exercises to develop strength, endurance and flexibility for 40 minutes including    Nustep level 4 x 10 min   Heel Cord stretch on wedge 3 x 30 sec    Heel Raises x 15   Forward Step Up 6" step x 15 each  Bilateral    Closed chain TKE 6" step x10 ea bilateral    Mini squat with glut med bias x10    Standing 3 way hip 15x B    STS x10   LAQ x20    Seated hamstring stretch 3 x 30 sec     QS x 2 min (DNP)   Bridges 2 x 10     SLR 2 x 10   SAQ x 2 min grey bolster (DNP)   Ball squeeze x 2 min   Supine Hip abd w/medium theraloop 2 x 10       Patient Education and Home Exercises     Home Exercises Provided and Patient Education Provided     Education provided:   - Added closed chain TKE, squat, and LAQ    Written Home Exercises Provided: Patient instructed to cont prior HEP. Illustrated " instructions to be issued at subsequent visit. Exercises were reviewed and Lynne was able to demonstrate them prior to the end of the session.  Lynne demonstrated good  understanding of the education provided. See EMR under Patient Instructions for exercises provided during therapy sessions     ASSESSMENT   Pt continues to show improvements in activity tolerance. Pt had no pain during today's session.  Lynne Is progressing well towards her goals.   Pt prognosis is Fair.     Pt will continue to benefit from skilled outpatient physical therapy to address the deficits listed in the problem list box on initial evaluation, provide pt/family education and to maximize pt's level of independence in the home and community environment.     Pt's spiritual, cultural and educational needs considered and pt agreeable to plan of care and goals.     Anticipated barriers to physical therapy: None    GOALS  Short Term Goals:               1) Facilitate improved LE flexibility 80% MET               2) Facilitate improved R patellar mobility MET              3) Decreased R knee tenderness to minimal Partially MET              4) Decrease swelling of R knee by 1/2 cm @ each affected level Not MET     Long Term Goals:                1) Patient will consistently walk > 30 min at a time without increased knee pain (80% MET)              2) Patient will consistently navigate 1 flight of steps utilizing reciprocating gait pattern with minimal increase in knee pain NOT MET              3) Patient will consistently sleep > 6 hours without interruption by knee pain 75% MET              4) Patient will consistently perform sit <> stand transfers safely with minimal UE support Progressing              5) Improve functional deficit to < 30% as indicated by FOTO knee survey Slight progress (43%)              6) Patient will be independent in complete HEP Progressing               7) NEW GOAL UPDATED 4/14  Pt reduce 10x STS time from 34 sec to  26 sec to show improvements in functional strength.    PLAN     Progress pt per POC.  Increase height and reps on forward step ups.      Vinay Amador, PTA

## 2022-05-04 ENCOUNTER — HOSPITAL ENCOUNTER (OUTPATIENT)
Dept: RADIOLOGY | Facility: HOSPITAL | Age: 67
Discharge: HOME | End: 2022-05-04
Attending: ORTHOPAEDIC SURGERY
Payer: MEDICARE

## 2022-05-04 ENCOUNTER — OFFICE VISIT (OUTPATIENT)
Dept: ORTHOPEDICS | Facility: CLINIC | Age: 67
End: 2022-05-04
Payer: MEDICARE

## 2022-05-04 VITALS — RESPIRATION RATE: 18 BRPM | BODY MASS INDEX: 29.52 KG/M2 | HEIGHT: 67 IN | WEIGHT: 188.06 LBS

## 2022-05-04 DIAGNOSIS — M23.51 CHRONIC INSTABILITY OF RIGHT KNEE: ICD-10-CM

## 2022-05-04 DIAGNOSIS — M17.11 PRIMARY OSTEOARTHRITIS OF RIGHT KNEE: Primary | ICD-10-CM

## 2022-05-04 DIAGNOSIS — M70.51 PES ANSERINUS BURSITIS OF RIGHT KNEE: ICD-10-CM

## 2022-05-04 DIAGNOSIS — M25.561 CHRONIC PAIN OF RIGHT KNEE: ICD-10-CM

## 2022-05-04 DIAGNOSIS — M71.21 BAKER CYST, RIGHT: ICD-10-CM

## 2022-05-04 DIAGNOSIS — G89.29 CHRONIC PAIN OF RIGHT KNEE: ICD-10-CM

## 2022-05-04 DIAGNOSIS — M17.0 PRIMARY OSTEOARTHRITIS OF BOTH KNEES: ICD-10-CM

## 2022-05-04 PROCEDURE — 3288F FALL RISK ASSESSMENT DOCD: CPT | Mod: CPTII,S$GLB,, | Performed by: ORTHOPAEDIC SURGERY

## 2022-05-04 PROCEDURE — 1101F PT FALLS ASSESS-DOCD LE1/YR: CPT | Mod: CPTII,S$GLB,, | Performed by: ORTHOPAEDIC SURGERY

## 2022-05-04 PROCEDURE — 99999 PR PBB SHADOW E&M-EST. PATIENT-LVL III: ICD-10-PCS | Mod: PBBFAC,,, | Performed by: ORTHOPAEDIC SURGERY

## 2022-05-04 PROCEDURE — 3008F BODY MASS INDEX DOCD: CPT | Mod: CPTII,S$GLB,, | Performed by: ORTHOPAEDIC SURGERY

## 2022-05-04 PROCEDURE — 20610 DRAIN/INJ JOINT/BURSA W/O US: CPT | Mod: RT,S$GLB,, | Performed by: ORTHOPAEDIC SURGERY

## 2022-05-04 PROCEDURE — 1101F PR PT FALLS ASSESS DOC 0-1 FALLS W/OUT INJ PAST YR: ICD-10-PCS | Mod: CPTII,S$GLB,, | Performed by: ORTHOPAEDIC SURGERY

## 2022-05-04 PROCEDURE — 73562 X-RAY EXAM OF KNEE 3: CPT | Mod: TC,FY,RT

## 2022-05-04 PROCEDURE — 1159F MED LIST DOCD IN RCRD: CPT | Mod: CPTII,S$GLB,, | Performed by: ORTHOPAEDIC SURGERY

## 2022-05-04 PROCEDURE — 99999 PR PBB SHADOW E&M-EST. PATIENT-LVL III: CPT | Mod: PBBFAC,,, | Performed by: ORTHOPAEDIC SURGERY

## 2022-05-04 PROCEDURE — 1125F AMNT PAIN NOTED PAIN PRSNT: CPT | Mod: CPTII,S$GLB,, | Performed by: ORTHOPAEDIC SURGERY

## 2022-05-04 PROCEDURE — 1125F PR PAIN SEVERITY QUANTIFIED, PAIN PRESENT: ICD-10-PCS | Mod: CPTII,S$GLB,, | Performed by: ORTHOPAEDIC SURGERY

## 2022-05-04 PROCEDURE — 73562 X-RAY EXAM OF KNEE 3: CPT | Mod: 26,RT,, | Performed by: RADIOLOGY

## 2022-05-04 PROCEDURE — 1159F PR MEDICATION LIST DOCUMENTED IN MEDICAL RECORD: ICD-10-PCS | Mod: CPTII,S$GLB,, | Performed by: ORTHOPAEDIC SURGERY

## 2022-05-04 PROCEDURE — 99213 OFFICE O/P EST LOW 20 MIN: CPT | Mod: 25,S$GLB,, | Performed by: ORTHOPAEDIC SURGERY

## 2022-05-04 PROCEDURE — 20610 LARGE JOINT ASPIRATION/INJECTION: R ANSERINE BURSA: ICD-10-PCS | Mod: RT,S$GLB,, | Performed by: ORTHOPAEDIC SURGERY

## 2022-05-04 PROCEDURE — 3288F PR FALLS RISK ASSESSMENT DOCUMENTED: ICD-10-PCS | Mod: CPTII,S$GLB,, | Performed by: ORTHOPAEDIC SURGERY

## 2022-05-04 PROCEDURE — 73562 XR KNEE 3 VIEW RIGHT: ICD-10-PCS | Mod: 26,RT,, | Performed by: RADIOLOGY

## 2022-05-04 PROCEDURE — 99213 PR OFFICE/OUTPT VISIT, EST, LEVL III, 20-29 MIN: ICD-10-PCS | Mod: 25,S$GLB,, | Performed by: ORTHOPAEDIC SURGERY

## 2022-05-04 PROCEDURE — 3008F PR BODY MASS INDEX (BMI) DOCUMENTED: ICD-10-PCS | Mod: CPTII,S$GLB,, | Performed by: ORTHOPAEDIC SURGERY

## 2022-05-04 RX ORDER — TRIAMCINOLONE ACETONIDE 40 MG/ML
40 INJECTION, SUSPENSION INTRA-ARTICULAR; INTRAMUSCULAR
Status: DISCONTINUED | OUTPATIENT
Start: 2022-05-04 | End: 2022-05-04 | Stop reason: HOSPADM

## 2022-05-04 RX ADMIN — TRIAMCINOLONE ACETONIDE 40 MG: 40 INJECTION, SUSPENSION INTRA-ARTICULAR; INTRAMUSCULAR at 01:05

## 2022-05-04 NOTE — PROGRESS NOTES
Patient ID: Lynne Lester is a 66 y.o. female.     Chief Complaint: Follow-up of the Right Knee        HPI:  Ms. Lester returned today with complaints of approximately 3 weeks of recurrent pain in her right knee.  Her knee pain has improved but she still has pain in her knee.  Her knee pain originally began after she fell multiple times.   Standing for an extended period and walking increase her pain.  Flexing and straightening her knee increases her symptoms.  Her pain is primarily at her medial and lateral tibial plateau with occasional pain over the fibular head.  She has taken NSAIDs without help.  She is currently doing physical therapy which does help.     ROS:  No new diagnosis/surgery/prescriptions since last office visit on 10/05/2021  Constitutional: Negative for chills and fever.   HENT: Negative for congestion.    Eyes: Negative for blurred vision.   Cardiovascular: Negative for chest pain.   Respiratory: Negative for cough.    Endocrine: Negative for polydipsia.   Hematologic/Lymphatic: Negative for adenopathy.   Skin: Negative for flushing and itching.   Musculoskeletal: Positive for joint pain. Negative for gout.   Gastrointestinal: Negative for constipation, diarrhea and heartburn.   Genitourinary: Negative for nocturia.   Neurological: Positive for headaches. Negative for seizures.   Psychiatric/Behavioral: Positive for depression. Negative for substance abuse. The patient is not nervous/anxious.    Allergic/Immunologic: Positive for environmental allergies.       Objective:   Physical Exam:   General: AAOx3.  No acute distress  Vascular:  Pulses intact and equal bilaterally.  Capillary refill less than 3 seconds and equal bilaterally  Neurologic:  Pinprick and soft touch intact and equal bilaterally  Integment:  No ecchymosis, no errythema  Extremity:  Knee:  Extension/flexion equal bilaterally 1/118 degrees.  Valgus alignment both knees.   Crepitus with motion both knees.  Mildly positive  patellar load/compression right knee.  Negative patella apprehension/relocation both knees.  Mild increased excursion with valgus stressing both knees.  Varus stressing equal bilaterally with endpoint.  Lachman's/drawer equal bilaterally with endpoint.  Relatively no medial joint line tenderness both knees.  Jessica negative both knees.  [place] relatively negative both knee.  Tender at the anserine insertion right.  Swelling at the anserine insertion right knee.  Baker cyst right knee  Radiography:  No new x-rays done today         Assessment:       Impression:   1. Pes anserine bursitis, right knee.  2. Tricompartmental arthritis, right knee.  3. Baker cyst, right knee.  4. Instability, right knee.  5. Right knee pain      Plan:       1.  Discussed physical examination and radiographic findings with the patient. Lynne understands that she has a symptomatic pes anserine bursitis and arthritis of her right knee.  Treatment alternatives and outcomes were discussed with the patient she understands she could be treated conservatively with observation, activity modification, NSAIDs, bracing, physical therapy, injections, or she could be treated surgically with bursectomy or total knee arthroplasty for her advanced arthritis.  She states she is not ready for surgical intervention would prefer to continue with conservative management.  Also discussed with the patient that she could trial another round of viscosupplementation since she has had multiple steroid injections but she needs to have a preauthorized before can be given.  Also discussed with the patient wearing a brace may help with the micro instability in her knee.  2. Offered a steroid injection to the anserine insertion of the right knee, she elected to proceed.  3. Andre[employer] Mcdan[employer] brace, right knee a prescription was prepared for the patient to obtain a brace.  4. Continue with Voltaren gel apply to right knee twice daily and massage in for 2  minutes.  5. Take NSAIDs as tolerated allowed by PCM.  6. Submit for preauthorization for viscosupplementation for the right knee.  7. Follow up p.r.n..

## 2022-05-04 NOTE — PROCEDURES
Large Joint Aspiration/Injection: R anserine bursa    Date/Time: 5/4/2022 1:45 PM  Performed by: Ish Alford DO  Authorized by: Ish Alford, DO     Consent Done?:  Yes (Verbal)  Indications:  Arthritis, diagnostic evaluation, joint swelling and pain  Site marked: the procedure site was marked    Timeout: prior to procedure the correct patient, procedure, and site was verified    Prep: patient was prepped and draped in usual sterile fashion      Details:  Needle Size:  22 G  Ultrasonic Guidance for needle placement?: No    Approach:  Anterolateral  Location:  Knee  Site:  R anserine bursa  Medications:  40 mg triamcinolone acetonide 40 mg/mL  Patient tolerance:  Patient tolerated the procedure well with no immediate complications

## 2022-05-05 ENCOUNTER — DOCUMENTATION ONLY (OUTPATIENT)
Dept: ORTHOPEDICS | Facility: CLINIC | Age: 67
End: 2022-05-05
Payer: MEDICARE

## 2022-05-19 ENCOUNTER — LAB VISIT (OUTPATIENT)
Dept: LAB | Facility: HOSPITAL | Age: 67
End: 2022-05-19
Attending: FAMILY MEDICINE
Payer: MEDICARE

## 2022-05-19 ENCOUNTER — OFFICE VISIT (OUTPATIENT)
Dept: FAMILY MEDICINE | Facility: CLINIC | Age: 67
End: 2022-05-19
Payer: MEDICARE

## 2022-05-19 VITALS
TEMPERATURE: 98 F | DIASTOLIC BLOOD PRESSURE: 76 MMHG | OXYGEN SATURATION: 97 % | HEART RATE: 87 BPM | HEIGHT: 67 IN | BODY MASS INDEX: 29.22 KG/M2 | RESPIRATION RATE: 14 BRPM | SYSTOLIC BLOOD PRESSURE: 120 MMHG | WEIGHT: 186.19 LBS

## 2022-05-19 DIAGNOSIS — E03.9 HYPOTHYROIDISM, UNSPECIFIED TYPE: ICD-10-CM

## 2022-05-19 DIAGNOSIS — Z00.00 ANNUAL PHYSICAL EXAM: ICD-10-CM

## 2022-05-19 DIAGNOSIS — Z11.59 ENCOUNTER FOR HEPATITIS C SCREENING TEST FOR LOW RISK PATIENT: ICD-10-CM

## 2022-05-19 DIAGNOSIS — Z13.6 ENCOUNTER FOR SCREENING FOR CARDIOVASCULAR DISORDERS: ICD-10-CM

## 2022-05-19 DIAGNOSIS — Z12.11 SCREENING FOR COLON CANCER: ICD-10-CM

## 2022-05-19 DIAGNOSIS — Z00.00 ANNUAL PHYSICAL EXAM: Primary | ICD-10-CM

## 2022-05-19 DIAGNOSIS — Z11.59 NEED FOR HEPATITIS C SCREENING TEST: ICD-10-CM

## 2022-05-19 LAB
ALBUMIN SERPL BCP-MCNC: 4.1 G/DL (ref 3.5–5.2)
ALP SERPL-CCNC: 66 U/L (ref 55–135)
ALT SERPL W/O P-5'-P-CCNC: 18 U/L (ref 10–44)
ANION GAP SERPL CALC-SCNC: 8 MMOL/L (ref 8–16)
AST SERPL-CCNC: 24 U/L (ref 10–40)
BASOPHILS # BLD AUTO: 0.05 K/UL (ref 0–0.2)
BASOPHILS NFR BLD: 0.8 % (ref 0–1.9)
BILIRUB SERPL-MCNC: 0.7 MG/DL (ref 0.1–1)
BUN SERPL-MCNC: 19 MG/DL (ref 8–23)
CALCIUM SERPL-MCNC: 9.2 MG/DL (ref 8.7–10.5)
CHLORIDE SERPL-SCNC: 100 MMOL/L (ref 95–110)
CHOLEST SERPL-MCNC: 287 MG/DL (ref 120–199)
CHOLEST/HDLC SERPL: 3.5 {RATIO} (ref 2–5)
CO2 SERPL-SCNC: 27 MMOL/L (ref 23–29)
CREAT SERPL-MCNC: 1.2 MG/DL (ref 0.5–1.4)
DIFFERENTIAL METHOD: ABNORMAL
EOSINOPHIL # BLD AUTO: 0.2 K/UL (ref 0–0.5)
EOSINOPHIL NFR BLD: 2.4 % (ref 0–8)
ERYTHROCYTE [DISTWIDTH] IN BLOOD BY AUTOMATED COUNT: 14.2 % (ref 11.5–14.5)
EST. GFR  (AFRICAN AMERICAN): 54.4 ML/MIN/1.73 M^2
EST. GFR  (NON AFRICAN AMERICAN): 47.2 ML/MIN/1.73 M^2
ESTIMATED AVG GLUCOSE: 97 MG/DL (ref 68–131)
GLUCOSE SERPL-MCNC: 94 MG/DL (ref 70–110)
HBA1C MFR BLD: 5 % (ref 4–5.6)
HCT VFR BLD AUTO: 37.6 % (ref 37–48.5)
HDLC SERPL-MCNC: 82 MG/DL (ref 40–75)
HDLC SERPL: 28.6 % (ref 20–50)
HGB BLD-MCNC: 12.6 G/DL (ref 12–16)
IMM GRANULOCYTES # BLD AUTO: 0.02 K/UL (ref 0–0.04)
IMM GRANULOCYTES NFR BLD AUTO: 0.3 % (ref 0–0.5)
LDLC SERPL CALC-MCNC: 191.4 MG/DL (ref 63–159)
LYMPHOCYTES # BLD AUTO: 1.9 K/UL (ref 1–4.8)
LYMPHOCYTES NFR BLD: 29.2 % (ref 18–48)
MCH RBC QN AUTO: 33.7 PG (ref 27–31)
MCHC RBC AUTO-ENTMCNC: 33.5 G/DL (ref 32–36)
MCV RBC AUTO: 101 FL (ref 82–98)
MONOCYTES # BLD AUTO: 0.6 K/UL (ref 0.3–1)
MONOCYTES NFR BLD: 8.9 % (ref 4–15)
NEUTROPHILS # BLD AUTO: 3.9 K/UL (ref 1.8–7.7)
NEUTROPHILS NFR BLD: 58.4 % (ref 38–73)
NONHDLC SERPL-MCNC: 205 MG/DL
NRBC BLD-RTO: 0 /100 WBC
PLATELET # BLD AUTO: 218 K/UL (ref 150–450)
PMV BLD AUTO: 10.5 FL (ref 9.2–12.9)
POTASSIUM SERPL-SCNC: 4.2 MMOL/L (ref 3.5–5.1)
PROT SERPL-MCNC: 7.6 G/DL (ref 6–8.4)
RBC # BLD AUTO: 3.74 M/UL (ref 4–5.4)
SODIUM SERPL-SCNC: 135 MMOL/L (ref 136–145)
TRIGL SERPL-MCNC: 68 MG/DL (ref 30–150)
TSH SERPL DL<=0.005 MIU/L-ACNC: 2.29 UIU/ML (ref 0.4–4)
WBC # BLD AUTO: 6.62 K/UL (ref 3.9–12.7)

## 2022-05-19 PROCEDURE — 85025 COMPLETE CBC W/AUTO DIFF WBC: CPT | Performed by: FAMILY MEDICINE

## 2022-05-19 PROCEDURE — 36415 COLL VENOUS BLD VENIPUNCTURE: CPT | Performed by: FAMILY MEDICINE

## 2022-05-19 PROCEDURE — 3008F BODY MASS INDEX DOCD: CPT | Mod: CPTII,S$GLB,, | Performed by: FAMILY MEDICINE

## 2022-05-19 PROCEDURE — 1101F PT FALLS ASSESS-DOCD LE1/YR: CPT | Mod: CPTII,S$GLB,, | Performed by: FAMILY MEDICINE

## 2022-05-19 PROCEDURE — 3074F SYST BP LT 130 MM HG: CPT | Mod: CPTII,S$GLB,, | Performed by: FAMILY MEDICINE

## 2022-05-19 PROCEDURE — 1159F MED LIST DOCD IN RCRD: CPT | Mod: CPTII,S$GLB,, | Performed by: FAMILY MEDICINE

## 2022-05-19 PROCEDURE — 86803 HEPATITIS C AB TEST: CPT | Performed by: FAMILY MEDICINE

## 2022-05-19 PROCEDURE — 1126F AMNT PAIN NOTED NONE PRSNT: CPT | Mod: CPTII,S$GLB,, | Performed by: FAMILY MEDICINE

## 2022-05-19 PROCEDURE — 3078F DIAST BP <80 MM HG: CPT | Mod: CPTII,S$GLB,, | Performed by: FAMILY MEDICINE

## 2022-05-19 PROCEDURE — 3288F PR FALLS RISK ASSESSMENT DOCUMENTED: ICD-10-PCS | Mod: CPTII,S$GLB,, | Performed by: FAMILY MEDICINE

## 2022-05-19 PROCEDURE — 99397 PR PREVENTIVE VISIT,EST,65 & OVER: ICD-10-PCS | Mod: S$GLB,,, | Performed by: FAMILY MEDICINE

## 2022-05-19 PROCEDURE — 80053 COMPREHEN METABOLIC PANEL: CPT | Performed by: FAMILY MEDICINE

## 2022-05-19 PROCEDURE — 80061 LIPID PANEL: CPT | Performed by: FAMILY MEDICINE

## 2022-05-19 PROCEDURE — 1160F RVW MEDS BY RX/DR IN RCRD: CPT | Mod: CPTII,S$GLB,, | Performed by: FAMILY MEDICINE

## 2022-05-19 PROCEDURE — 3288F FALL RISK ASSESSMENT DOCD: CPT | Mod: CPTII,S$GLB,, | Performed by: FAMILY MEDICINE

## 2022-05-19 PROCEDURE — 99397 PER PM REEVAL EST PAT 65+ YR: CPT | Mod: S$GLB,,, | Performed by: FAMILY MEDICINE

## 2022-05-19 PROCEDURE — 1159F PR MEDICATION LIST DOCUMENTED IN MEDICAL RECORD: ICD-10-PCS | Mod: CPTII,S$GLB,, | Performed by: FAMILY MEDICINE

## 2022-05-19 PROCEDURE — 1101F PR PT FALLS ASSESS DOC 0-1 FALLS W/OUT INJ PAST YR: ICD-10-PCS | Mod: CPTII,S$GLB,, | Performed by: FAMILY MEDICINE

## 2022-05-19 PROCEDURE — 3078F PR MOST RECENT DIASTOLIC BLOOD PRESSURE < 80 MM HG: ICD-10-PCS | Mod: CPTII,S$GLB,, | Performed by: FAMILY MEDICINE

## 2022-05-19 PROCEDURE — 99999 PR PBB SHADOW E&M-EST. PATIENT-LVL IV: ICD-10-PCS | Mod: PBBFAC,,, | Performed by: FAMILY MEDICINE

## 2022-05-19 PROCEDURE — 1126F PR PAIN SEVERITY QUANTIFIED, NO PAIN PRESENT: ICD-10-PCS | Mod: CPTII,S$GLB,, | Performed by: FAMILY MEDICINE

## 2022-05-19 PROCEDURE — 3008F PR BODY MASS INDEX (BMI) DOCUMENTED: ICD-10-PCS | Mod: CPTII,S$GLB,, | Performed by: FAMILY MEDICINE

## 2022-05-19 PROCEDURE — 1160F PR REVIEW ALL MEDS BY PRESCRIBER/CLIN PHARMACIST DOCUMENTED: ICD-10-PCS | Mod: CPTII,S$GLB,, | Performed by: FAMILY MEDICINE

## 2022-05-19 PROCEDURE — 3074F PR MOST RECENT SYSTOLIC BLOOD PRESSURE < 130 MM HG: ICD-10-PCS | Mod: CPTII,S$GLB,, | Performed by: FAMILY MEDICINE

## 2022-05-19 PROCEDURE — 83036 HEMOGLOBIN GLYCOSYLATED A1C: CPT | Performed by: FAMILY MEDICINE

## 2022-05-19 PROCEDURE — 84443 ASSAY THYROID STIM HORMONE: CPT | Performed by: FAMILY MEDICINE

## 2022-05-19 PROCEDURE — 99999 PR PBB SHADOW E&M-EST. PATIENT-LVL IV: CPT | Mod: PBBFAC,,, | Performed by: FAMILY MEDICINE

## 2022-05-19 NOTE — PROGRESS NOTES
"[facility] - Clinic Note    Subjective      Ms. Lester is a 66 y.o. female who presents to clinic for Follow-up (3mth follow up)    Patient has been doing well.  Blood pressure is controlled.  Was unable to get the Ozempic due to lack of insurance coverage.    PM Lynne has a past medical history of Carpal tunnel syndrome, Hypertension, Hypothyroidism (2/17/2022), and Osteoarthritis.   PSXH Lynne has a past surgical history that includes Tonsillectomy and Appendectomy.   FH Lynne's family history includes Alzheimer's disease in her mother; COPD in her father.   SH Lynne reports that she has quit smoking. She has never used smokeless tobacco. She reports current alcohol use. She reports that she does not use drugs.   ALG Lynne is allergic to codeine.   MED Lynne has a current medication list which includes the following prescription(s): cholecalciferol (vitamin d3), fluoxetine, ibandronate, levothyroxine, meloxicam, propranolol, and sumatriptan.     Review of Systems   Constitutional: Negative for chills and fever.   HENT: Negative for congestion and rhinorrhea.    Eyes: Negative for visual disturbance.   Respiratory: Negative for cough and shortness of breath.    Cardiovascular: Negative for chest pain.   Gastrointestinal: Negative for abdominal pain, constipation, diarrhea, nausea and vomiting.   Genitourinary: Negative for dysuria.   Musculoskeletal: Negative for myalgias.   Skin: Negative for rash.   Neurological: Negative for weakness and headaches.     Objective     /76 (BP Location: Left arm, Patient Position: Sitting, BP Method: Large (Manual))   Pulse 87   Temp 98.1 °F (36.7 °C) (Temporal)   Resp 14   Ht 5' 7" (1.702 m)   Wt 84.5 kg (186 lb 3.2 oz)   SpO2 97%   BMI 29.16 kg/m²     Physical Exam  Vitals and nursing note reviewed.   Constitutional:       General: She is not in acute distress.     Appearance: Normal appearance. She is well-developed. She is not diaphoretic. "   HENT:      Head: Normocephalic and atraumatic.      Right Ear: External ear normal.      Left Ear: External ear normal.   Eyes:      General:         Right eye: No discharge.         Left eye: No discharge.   Cardiovascular:      Rate and Rhythm: Normal rate and regular rhythm.      Heart sounds: Normal heart sounds.   Pulmonary:      Effort: Pulmonary effort is normal.      Breath sounds: Normal breath sounds. No wheezing or rales.   Skin:     General: Skin is warm and dry.   Neurological:      Mental Status: She is alert and oriented to person, place, and time. Mental status is at baseline.   Psychiatric:         Mood and Affect: Mood normal.         Behavior: Behavior normal.         Thought Content: Thought content normal.         Judgment: Judgment normal.        Assessment/Plan     1. Annual physical exam  Lipid Panel   2. Hypothyroidism, unspecified type  Comprehensive Metabolic Panel    CBC Auto Differential    Hemoglobin A1C    TSH   3. Encounter for hepatitis C screening test for low risk patient  Hepatitis C Antibody   4. Screening for colon cancer  Ambulatory referral/consult to General Surgery   5. Encounter for screening for cardiovascular disorders  Lipid Panel     Due for yearly labs as above.  Referral to general surgery for colon cancer screening.  Schedule mammogram.  Follow-up in 6 months.    Future Appointments   Date Time Provider Department Center   5/19/2022 10:50 AM [facility], LABORATORY [facility] LAB [facility]   6/1/2022  9:00 AM Ish Alford DO [address]   6/15/2022 10:15 AM Carine Dc MD [facility]   7/22/2022  9:30 AM [facility] MAMMO1 [facility] MAMMO [facility]   11/18/2022 10:20 AM Tyler Moreno MD [facility] FAMMED [facility]         Tyler Moreno MD  Family Medicine  [facility]

## 2022-05-24 DIAGNOSIS — E78.5 HYPERLIPIDEMIA, UNSPECIFIED HYPERLIPIDEMIA TYPE: Primary | ICD-10-CM

## 2022-05-24 LAB
HCV AB SERPL QL IA: NEGATIVE
HCV AB SERPL QL IA: NEGATIVE

## 2022-05-24 RX ORDER — ATORVASTATIN CALCIUM 40 MG/1
40 TABLET, FILM COATED ORAL DAILY
Qty: 90 TABLET | Refills: 3 | Status: SHIPPED | OUTPATIENT
Start: 2022-05-24 | End: 2022-08-22

## 2022-05-25 DIAGNOSIS — E78.5 HYPERLIPIDEMIA, UNSPECIFIED HYPERLIPIDEMIA TYPE: ICD-10-CM

## 2022-05-25 RX ORDER — MELOXICAM 15 MG/1
TABLET ORAL
Qty: 30 TABLET | Refills: 1 | Status: SHIPPED | OUTPATIENT
Start: 2022-05-25 | End: 2022-08-22

## 2022-05-25 RX ORDER — ATORVASTATIN CALCIUM 40 MG/1
40 TABLET, FILM COATED ORAL DAILY
Qty: 90 TABLET | Refills: 3 | Status: CANCELLED | OUTPATIENT
Start: 2022-05-25 | End: 2023-05-25

## 2022-05-25 NOTE — TELEPHONE ENCOUNTER
LOV 5/19/2022 with Dr. Sánchez    Medication last prescribed 05/24/2022    Spoke with patient to let her know this medication was sent to [facility] on 5/24/2022 @715pm.     She had not called to check with the pharmacy to see if medicine was ready.    Patient wanted to make a note that she will continue use [facility] as her primary pharmacy.

## 2022-05-25 NOTE — TELEPHONE ENCOUNTER
Please see pended refill request.    Medication last prescribed 04/01/2022  By dr. Sánchez    LOV 5/19/2022 with dr Sánchez

## 2022-05-31 ENCOUNTER — PATIENT MESSAGE (OUTPATIENT)
Dept: ADMINISTRATIVE | Facility: HOSPITAL | Age: 67
End: 2022-05-31
Payer: MEDICARE

## 2022-06-01 ENCOUNTER — OFFICE VISIT (OUTPATIENT)
Dept: ORTHOPEDICS | Facility: CLINIC | Age: 67
End: 2022-06-01
Payer: MEDICARE

## 2022-06-01 VITALS — WEIGHT: 186.31 LBS | HEIGHT: 67 IN | RESPIRATION RATE: 18 BRPM | BODY MASS INDEX: 29.24 KG/M2

## 2022-06-01 DIAGNOSIS — M17.11 PRIMARY OSTEOARTHRITIS OF RIGHT KNEE: Primary | ICD-10-CM

## 2022-06-01 DIAGNOSIS — M25.561 CHRONIC PAIN OF RIGHT KNEE: ICD-10-CM

## 2022-06-01 DIAGNOSIS — G89.29 CHRONIC PAIN OF RIGHT KNEE: ICD-10-CM

## 2022-06-01 DIAGNOSIS — M71.21 BAKER CYST, RIGHT: ICD-10-CM

## 2022-06-01 PROCEDURE — 1101F PR PT FALLS ASSESS DOC 0-1 FALLS W/OUT INJ PAST YR: ICD-10-PCS | Mod: CPTII,S$GLB,, | Performed by: ORTHOPAEDIC SURGERY

## 2022-06-01 PROCEDURE — 3008F BODY MASS INDEX DOCD: CPT | Mod: CPTII,S$GLB,, | Performed by: ORTHOPAEDIC SURGERY

## 2022-06-01 PROCEDURE — 20610 DRAIN/INJ JOINT/BURSA W/O US: CPT | Mod: RT,S$GLB,, | Performed by: ORTHOPAEDIC SURGERY

## 2022-06-01 PROCEDURE — 3288F FALL RISK ASSESSMENT DOCD: CPT | Mod: CPTII,S$GLB,, | Performed by: ORTHOPAEDIC SURGERY

## 2022-06-01 PROCEDURE — 99499 NO LOS: ICD-10-PCS | Mod: S$GLB,,, | Performed by: ORTHOPAEDIC SURGERY

## 2022-06-01 PROCEDURE — 99499 UNLISTED E&M SERVICE: CPT | Mod: S$GLB,,, | Performed by: ORTHOPAEDIC SURGERY

## 2022-06-01 PROCEDURE — 3288F PR FALLS RISK ASSESSMENT DOCUMENTED: ICD-10-PCS | Mod: CPTII,S$GLB,, | Performed by: ORTHOPAEDIC SURGERY

## 2022-06-01 PROCEDURE — 1125F AMNT PAIN NOTED PAIN PRSNT: CPT | Mod: CPTII,S$GLB,, | Performed by: ORTHOPAEDIC SURGERY

## 2022-06-01 PROCEDURE — 99999 PR PBB SHADOW E&M-EST. PATIENT-LVL III: CPT | Mod: PBBFAC,,, | Performed by: ORTHOPAEDIC SURGERY

## 2022-06-01 PROCEDURE — 20610 LARGE JOINT ASPIRATION/INJECTION: R KNEE: ICD-10-PCS | Mod: RT,S$GLB,, | Performed by: ORTHOPAEDIC SURGERY

## 2022-06-01 PROCEDURE — 1159F MED LIST DOCD IN RCRD: CPT | Mod: CPTII,S$GLB,, | Performed by: ORTHOPAEDIC SURGERY

## 2022-06-01 PROCEDURE — 1159F PR MEDICATION LIST DOCUMENTED IN MEDICAL RECORD: ICD-10-PCS | Mod: CPTII,S$GLB,, | Performed by: ORTHOPAEDIC SURGERY

## 2022-06-01 PROCEDURE — 3008F PR BODY MASS INDEX (BMI) DOCUMENTED: ICD-10-PCS | Mod: CPTII,S$GLB,, | Performed by: ORTHOPAEDIC SURGERY

## 2022-06-01 PROCEDURE — 1101F PT FALLS ASSESS-DOCD LE1/YR: CPT | Mod: CPTII,S$GLB,, | Performed by: ORTHOPAEDIC SURGERY

## 2022-06-01 PROCEDURE — 3044F PR MOST RECENT HEMOGLOBIN A1C LEVEL <7.0%: ICD-10-PCS | Mod: CPTII,S$GLB,, | Performed by: ORTHOPAEDIC SURGERY

## 2022-06-01 PROCEDURE — 3044F HG A1C LEVEL LT 7.0%: CPT | Mod: CPTII,S$GLB,, | Performed by: ORTHOPAEDIC SURGERY

## 2022-06-01 PROCEDURE — 99999 PR PBB SHADOW E&M-EST. PATIENT-LVL III: ICD-10-PCS | Mod: PBBFAC,,, | Performed by: ORTHOPAEDIC SURGERY

## 2022-06-01 PROCEDURE — 1125F PR PAIN SEVERITY QUANTIFIED, PAIN PRESENT: ICD-10-PCS | Mod: CPTII,S$GLB,, | Performed by: ORTHOPAEDIC SURGERY

## 2022-06-01 NOTE — PROCEDURES
Large Joint Aspiration/Injection: R knee    Date/Time: 6/1/2022 9:00 AM  Performed by: Ish Alford DO  Authorized by: Ish Alford, DO     Consent Done?:  Yes (Verbal)  Indications:  Arthritis, diagnostic evaluation and pain  Site marked: the procedure site was marked    Timeout: prior to procedure the correct patient, procedure, and site was verified    Prep: patient was prepped and draped in usual sterile fashion      Details:  Needle Size:  22 G  Ultrasonic Guidance for needle placement?: No    Approach:  Anterolateral  Location:  Knee  Site:  R knee  Medications:  48 mg hylan g-f 20 48 mg/6 mL  Patient tolerance:  Patient tolerated the procedure well with no immediate complications

## 2022-06-01 NOTE — PROGRESS NOTES
Subjective:      Patient ID: Lynne Lester is a 66 y.o. female.    Chief Complaint: Injections of the Right Knee      HPI:  Ms. Lester returned today for re-evaluation of her right knee.  At her last visit on 05/04/2022 she was given a steroid injection which gave her some relief of her pain.  She has been wearing a brace which helps.  She has been taken NSAIDs with help. Her knee pain originally began after she fell multiple times.     ROS:  No new diagnosis/surgery/prescriptions since last office visit on 05/04/2022.  Constitutional: Negative for chills and fever.   HENT: Negative for congestion.    Eyes: Negative for blurred vision.   Cardiovascular: Negative for chest pain.   Respiratory: Negative for cough.    Endocrine: Negative for polydipsia.   Hematologic/Lymphatic: Negative for adenopathy.   Skin: Negative for flushing and itching.   Musculoskeletal: Positive for joint pain. Negative for gout.   Gastrointestinal: Negative for constipation, diarrhea and heartburn.   Genitourinary: Negative for nocturia.   Neurological: Positive for headaches. Negative for seizures.   Psychiatric/Behavioral: Positive for depression. Negative for substance abuse. The patient is not nervous/anxious.    Allergic/Immunologic: Positive for environmental allergies.       Objective:      Physical Exam:   General: AAOx3.  No acute distress  Vascular:  Pulses intact and equal bilaterally.  Capillary refill less than 3 seconds and equal bilaterally  Neurologic:  Pinprick and soft touch intact and equal bilaterally  Integment:  No ecchymosis, no errythema  Extremity: Knee:  Extension/flexion equal bilaterally 1/118 degrees.  Valgus alignment both knees.   Crepitus with motion both knees.  Mildly positive patellar load/compression right knee.  Negative patella apprehension/relocation both knees.  Mild increased excursion with valgus stressing both knees.  Varus stressing equal bilaterally with endpoint.  Lachman's/drawer equal  bilaterally with endpoint.  Relatively no medial joint line tenderness both knees.  Jessica negative both knees.  [place] relatively negative both knee.  Nontender at the anserine insertion both knees  No swelling at the anserine insertion both knee.  Baker cyst right knee  Radiography:  No new x-rays done today        Assessment:       Impression:     1. Primary osteoarthritis of right knee    2. Baker cyst, right    3. Chronic pain of right knee          Plan:       1.  Discussed physical examination with the patient. Lynne understands that she has persistent arthritis in her right knee.  She understands she has been preauthorized for viscosupplementation she could proceed with viscosupplementation if she desires.  2. Offered Synvisc-One to the right knee, she elected to proceed.  3. Continue with knee brace wear.  4. Continue with home exercises such as quadriceps and hamstring strengthening.  5. May trial over-the-counter Voltaren gel applied to the knee twice daily and massage in for 2 minutes.  6. Take NSAIDs as tolerated allowed by PCM  7. Follow up p.r.n..

## 2022-06-15 ENCOUNTER — OFFICE VISIT (OUTPATIENT)
Dept: SURGERY | Facility: CLINIC | Age: 67
End: 2022-06-15
Payer: MEDICARE

## 2022-06-15 VITALS
RESPIRATION RATE: 16 BRPM | HEART RATE: 84 BPM | OXYGEN SATURATION: 97 % | HEIGHT: 67 IN | BODY MASS INDEX: 29.35 KG/M2 | DIASTOLIC BLOOD PRESSURE: 81 MMHG | SYSTOLIC BLOOD PRESSURE: 135 MMHG | WEIGHT: 187 LBS

## 2022-06-15 DIAGNOSIS — Z01.818 PRE-OP TESTING: ICD-10-CM

## 2022-06-15 DIAGNOSIS — Z12.11 SCREENING FOR COLON CANCER: Primary | ICD-10-CM

## 2022-06-15 PROCEDURE — 1101F PR PT FALLS ASSESS DOC 0-1 FALLS W/OUT INJ PAST YR: ICD-10-PCS | Mod: CPTII,S$GLB,, | Performed by: STUDENT IN AN ORGANIZED HEALTH CARE EDUCATION/TRAINING PROGRAM

## 2022-06-15 PROCEDURE — 99999 PR PBB SHADOW E&M-EST. PATIENT-LVL V: ICD-10-PCS | Mod: PBBFAC,,, | Performed by: STUDENT IN AN ORGANIZED HEALTH CARE EDUCATION/TRAINING PROGRAM

## 2022-06-15 PROCEDURE — 3075F SYST BP GE 130 - 139MM HG: CPT | Mod: CPTII,S$GLB,, | Performed by: STUDENT IN AN ORGANIZED HEALTH CARE EDUCATION/TRAINING PROGRAM

## 2022-06-15 PROCEDURE — 3008F BODY MASS INDEX DOCD: CPT | Mod: CPTII,S$GLB,, | Performed by: STUDENT IN AN ORGANIZED HEALTH CARE EDUCATION/TRAINING PROGRAM

## 2022-06-15 PROCEDURE — 99202 OFFICE O/P NEW SF 15 MIN: CPT | Mod: S$GLB,,, | Performed by: STUDENT IN AN ORGANIZED HEALTH CARE EDUCATION/TRAINING PROGRAM

## 2022-06-15 PROCEDURE — 3079F DIAST BP 80-89 MM HG: CPT | Mod: CPTII,S$GLB,, | Performed by: STUDENT IN AN ORGANIZED HEALTH CARE EDUCATION/TRAINING PROGRAM

## 2022-06-15 PROCEDURE — 3075F PR MOST RECENT SYSTOLIC BLOOD PRESS GE 130-139MM HG: ICD-10-PCS | Mod: CPTII,S$GLB,, | Performed by: STUDENT IN AN ORGANIZED HEALTH CARE EDUCATION/TRAINING PROGRAM

## 2022-06-15 PROCEDURE — 99202 PR OFFICE/OUTPT VISIT, NEW, LEVL II, 15-29 MIN: ICD-10-PCS | Mod: S$GLB,,, | Performed by: STUDENT IN AN ORGANIZED HEALTH CARE EDUCATION/TRAINING PROGRAM

## 2022-06-15 PROCEDURE — 99999 PR PBB SHADOW E&M-EST. PATIENT-LVL V: CPT | Mod: PBBFAC,,, | Performed by: STUDENT IN AN ORGANIZED HEALTH CARE EDUCATION/TRAINING PROGRAM

## 2022-06-15 PROCEDURE — 3044F HG A1C LEVEL LT 7.0%: CPT | Mod: CPTII,S$GLB,, | Performed by: STUDENT IN AN ORGANIZED HEALTH CARE EDUCATION/TRAINING PROGRAM

## 2022-06-15 PROCEDURE — 1159F PR MEDICATION LIST DOCUMENTED IN MEDICAL RECORD: ICD-10-PCS | Mod: CPTII,S$GLB,, | Performed by: STUDENT IN AN ORGANIZED HEALTH CARE EDUCATION/TRAINING PROGRAM

## 2022-06-15 PROCEDURE — 1159F MED LIST DOCD IN RCRD: CPT | Mod: CPTII,S$GLB,, | Performed by: STUDENT IN AN ORGANIZED HEALTH CARE EDUCATION/TRAINING PROGRAM

## 2022-06-15 PROCEDURE — 1101F PT FALLS ASSESS-DOCD LE1/YR: CPT | Mod: CPTII,S$GLB,, | Performed by: STUDENT IN AN ORGANIZED HEALTH CARE EDUCATION/TRAINING PROGRAM

## 2022-06-15 PROCEDURE — 1126F AMNT PAIN NOTED NONE PRSNT: CPT | Mod: CPTII,S$GLB,, | Performed by: STUDENT IN AN ORGANIZED HEALTH CARE EDUCATION/TRAINING PROGRAM

## 2022-06-15 PROCEDURE — 3044F PR MOST RECENT HEMOGLOBIN A1C LEVEL <7.0%: ICD-10-PCS | Mod: CPTII,S$GLB,, | Performed by: STUDENT IN AN ORGANIZED HEALTH CARE EDUCATION/TRAINING PROGRAM

## 2022-06-15 PROCEDURE — 3079F PR MOST RECENT DIASTOLIC BLOOD PRESSURE 80-89 MM HG: ICD-10-PCS | Mod: CPTII,S$GLB,, | Performed by: STUDENT IN AN ORGANIZED HEALTH CARE EDUCATION/TRAINING PROGRAM

## 2022-06-15 PROCEDURE — 3008F PR BODY MASS INDEX (BMI) DOCUMENTED: ICD-10-PCS | Mod: CPTII,S$GLB,, | Performed by: STUDENT IN AN ORGANIZED HEALTH CARE EDUCATION/TRAINING PROGRAM

## 2022-06-15 PROCEDURE — 1126F PR PAIN SEVERITY QUANTIFIED, NO PAIN PRESENT: ICD-10-PCS | Mod: CPTII,S$GLB,, | Performed by: STUDENT IN AN ORGANIZED HEALTH CARE EDUCATION/TRAINING PROGRAM

## 2022-06-15 PROCEDURE — 3288F FALL RISK ASSESSMENT DOCD: CPT | Mod: CPTII,S$GLB,, | Performed by: STUDENT IN AN ORGANIZED HEALTH CARE EDUCATION/TRAINING PROGRAM

## 2022-06-15 PROCEDURE — 3288F PR FALLS RISK ASSESSMENT DOCUMENTED: ICD-10-PCS | Mod: CPTII,S$GLB,, | Performed by: STUDENT IN AN ORGANIZED HEALTH CARE EDUCATION/TRAINING PROGRAM

## 2022-06-15 RX ORDER — SODIUM CHLORIDE 9 MG/ML
INJECTION, SOLUTION INTRAVENOUS CONTINUOUS
Status: CANCELLED | OUTPATIENT
Start: 2022-06-15

## 2022-06-15 NOTE — H&P
[facility] Surgery H&P Note    Subjective:       Patient ID: Lynne Lester is a 66 y.o. female.    Chief Complaint:  I need a screening colonoscopy.  HPI:  Lynne Lester is a 66 y.o. female presents today for evaluation of screening colonoscopy.    The patient has no hematochezia.    The patient has no family history of colon cancer.    The patient denies weight loss or bowel habit changes.  Last colonoscopy was over 10 years ago in [place].  She had a few polyps removed and was told to screen again in 10 years.    Past Medical History:   Diagnosis Date    Carpal tunnel syndrome     Bilateral    Hypertension     Hypothyroidism 2/17/2022    Osteoarthritis      Past Surgical History:   Procedure Laterality Date    APPENDECTOMY      TONSILLECTOMY       Family History   Problem Relation Age of Onset    Alzheimer's disease Mother     COPD Father     Cancer Paternal Grandmother         colon     Social History     Socioeconomic History    Marital status:    Tobacco Use    Smoking status: Former Smoker    Smokeless tobacco: Never Used   Substance and Sexual Activity    Alcohol use: Yes    Drug use: Never       Current Outpatient Medications   Medication Sig Dispense Refill    atorvastatin (LIPITOR) 40 MG tablet Take 1 tablet (40 mg total) by mouth once daily. 90 tablet 3    cholecalciferol, vitamin D3, 125 mcg (5,000 unit) capsule Take 5,000 Units by mouth.      FLUoxetine 20 MG capsule   See Instructions, TAKE 1 CAPSULE BY MOUTH DAILY, # 90 cap, 3 Refill(s), Pharmacy: [id number], 166, cm, 09/08/21 10:36:00 CDT, Height/Length Measured, 85.7, kg, 09/08/21 10:36:00 CDT, Weight Dosing      ibandronate (BONIVA) 150 mg tablet Take 1 tablet (150 mg total) by mouth every 30 days. 1 tablet 11    levothyroxine (SYNTHROID) 88 MCG tablet Take 88 mcg by mouth once daily.      meloxicam (MOBIC) 15 MG tablet TAKE 1 TABLET BY MOUTH DAILY AS NEEDED FOR PAIN 30 tablet 1     "propranoloL (INDERAL LA) 60 MG 24 hr capsule Take 1 capsule (60 mg total) by mouth once daily. 90 capsule 3    sumatriptan (IMITREX) 50 MG tablet sumatriptan 50 mg tablet       No current facility-administered medications for this visit.     Review of patient's allergies indicates:   Allergen Reactions    Codeine Rash       10 point review of systems negative.    Objective:      Vitals:    06/15/22 1008   BP: 135/81   Pulse: 84   Resp: 16   SpO2: 97%   Weight: 84.8 kg (187 lb)   Height: 5' 7" (1.702 m)     Physical examination.  General well-developed well-nourished female in no acute distress.  Cardiovascular regular rate and rhythm.  Lungs nonlabored breathing, clear to auscultation bilateral  Abdomen soft nontender nondistended  Extremities no cyanosis clubbing or edema  Neuro afocal  Skin no rashes bruises or abrasions.         Assessment:  In need of screening colonoscopy.    Plan:  Screening colonoscopy.    Medical Decision Making/Counseling:  Risk and benefits of screening colonoscopy have been discussed in detail with the patient.  Risk of bleeding (significant 1 in 500 cases), perforation (1 in 5000 cases), aspiration, need for further surgeries, need for admission to the hospital, need for blood transfusions etcetera have all been discussed.  During the procedure, small polyps (colon growths) will be removed and any inflammation irritation or masslike structures will be biopsied.  Patient voiced understanding, and agreement.  After informed discussion with the patient they voiced understanding of the risk benefits of the outlined procedure and desired to proceed.  All questions were answered to the patient's satisfaction.  They will be followed up if there is any pathology to be reviewed in surgery clinic in the next couple of weeks for evaluation    "

## 2022-06-15 NOTE — PROGRESS NOTES
Patient, Lynne Lester, was instructed on Magnesium Citrate bowel prep. Patient was given instructions on pre-op, reta-op, and post-op scheduled appointments.  Patient received blue folder containing all written instructions.      Khoa Ohara MA

## 2022-07-22 ENCOUNTER — HOSPITAL ENCOUNTER (OUTPATIENT)
Dept: RADIOLOGY | Facility: HOSPITAL | Age: 67
Discharge: HOME | End: 2022-07-22
Attending: FAMILY MEDICINE
Payer: MEDICARE

## 2022-07-22 VITALS — WEIGHT: 185.19 LBS | HEIGHT: 67 IN | BODY MASS INDEX: 29.07 KG/M2

## 2022-07-22 DIAGNOSIS — Z12.31 ENCOUNTER FOR SCREENING MAMMOGRAM FOR MALIGNANT NEOPLASM OF BREAST: ICD-10-CM

## 2022-07-22 PROCEDURE — 77067 MAMMO DIGITAL SCREENING BILAT WITH TOMO: ICD-10-PCS | Mod: 26,,, | Performed by: RADIOLOGY

## 2022-07-22 PROCEDURE — 77067 SCR MAMMO BI INCL CAD: CPT | Mod: 26,,, | Performed by: RADIOLOGY

## 2022-07-22 PROCEDURE — 77063 BREAST TOMOSYNTHESIS BI: CPT | Mod: 26,,, | Performed by: RADIOLOGY

## 2022-07-22 PROCEDURE — 77067 SCR MAMMO BI INCL CAD: CPT | Mod: TC

## 2022-07-22 PROCEDURE — 77063 MAMMO DIGITAL SCREENING BILAT WITH TOMO: ICD-10-PCS | Mod: 26,,, | Performed by: RADIOLOGY

## 2022-07-22 PROCEDURE — 77063 BREAST TOMOSYNTHESIS BI: CPT | Mod: TC

## 2022-07-27 RX ORDER — MELOXICAM 15 MG/1
15 TABLET ORAL DAILY
Status: CANCELLED | OUTPATIENT
Start: 2022-07-27

## 2022-08-19 ENCOUNTER — PATIENT MESSAGE (OUTPATIENT)
Dept: FAMILY MEDICINE | Facility: CLINIC | Age: 67
End: 2022-08-19
Payer: COMMERCIAL

## 2022-08-22 RX ORDER — SUMATRIPTAN SUCCINATE 100 MG/1
100 TABLET ORAL
Qty: 30 TABLET | Refills: 0 | Status: SHIPPED | OUTPATIENT
Start: 2022-08-22

## 2022-08-30 ENCOUNTER — TELEPHONE (OUTPATIENT)
Dept: FAMILY MEDICINE | Facility: CLINIC | Age: 67
End: 2022-08-30
Payer: COMMERCIAL

## 2022-08-30 NOTE — TELEPHONE ENCOUNTER
----- Message from Tyler Moreno MD sent at 8/29/2022  1:20 PM CDT -----  Contact: Hanna  Should be taking boniva - please cancel prescription for fosamax  ----- Message -----  From: Destinee Dos Santos  Sent: 8/29/2022   1:16 PM CDT  To: Tyler Moreno MD    Type: Needs Medical Advice  Who Called:  Hanna/[facility]   Best Call Back Number:  ref [phone]    Additional Information: calling to clarify which rx pt is on... alendronate (FOSAMAX) 70 MG or ibandronate (BONIVA) 150 mg.. please call and adv-

## 2022-08-31 DIAGNOSIS — Z78.0 MENOPAUSE: ICD-10-CM

## 2022-09-02 ENCOUNTER — HOSPITAL ENCOUNTER (OUTPATIENT)
Dept: CARDIOLOGY | Facility: HOSPITAL | Age: 67
Discharge: HOME | End: 2022-09-02
Attending: STUDENT IN AN ORGANIZED HEALTH CARE EDUCATION/TRAINING PROGRAM
Payer: MEDICARE

## 2022-09-02 DIAGNOSIS — Z01.818 PRE-OP TESTING: ICD-10-CM

## 2022-09-02 PROCEDURE — 93010 ELECTROCARDIOGRAM REPORT: CPT | Mod: ,,, | Performed by: INTERNAL MEDICINE

## 2022-09-02 PROCEDURE — 93010 EKG 12-LEAD: ICD-10-PCS | Mod: ,,, | Performed by: INTERNAL MEDICINE

## 2022-09-02 PROCEDURE — 93005 ELECTROCARDIOGRAM TRACING: CPT

## 2022-09-03 ENCOUNTER — HOSPITAL ENCOUNTER (OUTPATIENT)
Dept: RADIOLOGY | Facility: HOSPITAL | Age: 67
Discharge: HOME | End: 2022-09-03
Attending: FAMILY MEDICINE
Payer: COMMERCIAL

## 2022-09-03 DIAGNOSIS — Z78.0 MENOPAUSE: ICD-10-CM

## 2022-09-06 ENCOUNTER — TELEPHONE (OUTPATIENT)
Dept: SURGERY | Facility: CLINIC | Age: 67
End: 2022-09-06
Payer: COMMERCIAL

## 2022-09-06 ENCOUNTER — PATIENT MESSAGE (OUTPATIENT)
Dept: SURGERY | Facility: CLINIC | Age: 67
End: 2022-09-06
Payer: COMMERCIAL

## 2022-09-06 NOTE — TELEPHONE ENCOUNTER
Writer spoke to pt, explained that the Miralax bowel prep instructions had been sent through her [facility] portal. Pt stated she would get her portal and take a look at it. Pt was instructed if she had questions to call or message our office . Pt was also instructed that surgery would notify her of the time of arrival for procedure the day she was doing her bowel prep. Pt was instructed the bowel prep instruction sheet has a number to surgery. Pt verbalized understanding.

## 2022-09-06 NOTE — TELEPHONE ENCOUNTER
----- Message from Maegan Raya sent at 9/6/2022  8:37 AM CDT -----  Regarding: pt called  Name of Who is Calling: FLORENCE NOBLES [[id number]]      What is the request in detail: pt is requesting to cancel her procedure on 09/08/2022 due to her not being able to find the prep. Please advise       Can the clinic reply by [facility]: No      [facility]: [phone]

## 2022-09-08 ENCOUNTER — HOSPITAL ENCOUNTER (OUTPATIENT)
Facility: HOSPITAL | Age: 67
Discharge: HOME | End: 2022-09-08
Attending: STUDENT IN AN ORGANIZED HEALTH CARE EDUCATION/TRAINING PROGRAM | Admitting: STUDENT IN AN ORGANIZED HEALTH CARE EDUCATION/TRAINING PROGRAM
Payer: MEDICARE

## 2022-09-08 ENCOUNTER — ANESTHESIA EVENT (OUTPATIENT)
Dept: SURGERY | Facility: HOSPITAL | Age: 67
End: 2022-09-08
Payer: MEDICARE

## 2022-09-08 ENCOUNTER — ANESTHESIA (OUTPATIENT)
Dept: SURGERY | Facility: HOSPITAL | Age: 67
End: 2022-09-08
Payer: MEDICARE

## 2022-09-08 VITALS
HEART RATE: 76 BPM | SYSTOLIC BLOOD PRESSURE: 141 MMHG | HEIGHT: 67 IN | DIASTOLIC BLOOD PRESSURE: 86 MMHG | TEMPERATURE: 98 F | OXYGEN SATURATION: 95 % | WEIGHT: 187 LBS | RESPIRATION RATE: 17 BRPM | BODY MASS INDEX: 29.35 KG/M2

## 2022-09-08 DIAGNOSIS — Z12.11 SCREENING FOR COLON CANCER: ICD-10-CM

## 2022-09-08 PROCEDURE — G0121 COLON CA SCRN NOT HI RSK IND: HCPCS | Performed by: STUDENT IN AN ORGANIZED HEALTH CARE EDUCATION/TRAINING PROGRAM

## 2022-09-08 PROCEDURE — D9220A PRA ANESTHESIA: Mod: ANES,,, | Performed by: ANESTHESIOLOGY

## 2022-09-08 PROCEDURE — D9220A PRA ANESTHESIA: ICD-10-PCS | Mod: CRNA,,, | Performed by: NURSE ANESTHETIST, CERTIFIED REGISTERED

## 2022-09-08 PROCEDURE — G0121 COLON CA SCRN NOT HI RSK IND: ICD-10-PCS | Mod: ,,, | Performed by: STUDENT IN AN ORGANIZED HEALTH CARE EDUCATION/TRAINING PROGRAM

## 2022-09-08 PROCEDURE — G0121 COLON CA SCRN NOT HI RSK IND: HCPCS | Mod: ,,, | Performed by: STUDENT IN AN ORGANIZED HEALTH CARE EDUCATION/TRAINING PROGRAM

## 2022-09-08 PROCEDURE — 37000009 HC ANESTHESIA EA ADD 15 MINS: Performed by: STUDENT IN AN ORGANIZED HEALTH CARE EDUCATION/TRAINING PROGRAM

## 2022-09-08 PROCEDURE — 63600175 PHARM REV CODE 636 W HCPCS: Performed by: ANESTHESIOLOGY

## 2022-09-08 PROCEDURE — D9220A PRA ANESTHESIA: Mod: CRNA,,, | Performed by: NURSE ANESTHETIST, CERTIFIED REGISTERED

## 2022-09-08 PROCEDURE — 25000003 PHARM REV CODE 250: Performed by: NURSE ANESTHETIST, CERTIFIED REGISTERED

## 2022-09-08 PROCEDURE — D9220A PRA ANESTHESIA: ICD-10-PCS | Mod: ANES,,, | Performed by: ANESTHESIOLOGY

## 2022-09-08 PROCEDURE — 63600175 PHARM REV CODE 636 W HCPCS: Performed by: NURSE ANESTHETIST, CERTIFIED REGISTERED

## 2022-09-08 PROCEDURE — 37000008 HC ANESTHESIA 1ST 15 MINUTES: Performed by: STUDENT IN AN ORGANIZED HEALTH CARE EDUCATION/TRAINING PROGRAM

## 2022-09-08 RX ORDER — DIPHENHYDRAMINE HYDROCHLORIDE 50 MG/ML
12.5 INJECTION INTRAMUSCULAR; INTRAVENOUS
Status: DISCONTINUED | OUTPATIENT
Start: 2022-09-08 | End: 2022-09-08 | Stop reason: HOSPADM

## 2022-09-08 RX ORDER — SODIUM CHLORIDE 9 MG/ML
INJECTION, SOLUTION INTRAVENOUS CONTINUOUS
Status: DISCONTINUED | OUTPATIENT
Start: 2022-09-08 | End: 2022-09-08 | Stop reason: HOSPADM

## 2022-09-08 RX ORDER — SODIUM CHLORIDE, SODIUM LACTATE, POTASSIUM CHLORIDE, CALCIUM CHLORIDE 600; 310; 30; 20 MG/100ML; MG/100ML; MG/100ML; MG/100ML
125 INJECTION, SOLUTION INTRAVENOUS CONTINUOUS
Status: DISCONTINUED | OUTPATIENT
Start: 2022-09-08 | End: 2022-09-08 | Stop reason: HOSPADM

## 2022-09-08 RX ORDER — SODIUM CHLORIDE, SODIUM LACTATE, POTASSIUM CHLORIDE, CALCIUM CHLORIDE 600; 310; 30; 20 MG/100ML; MG/100ML; MG/100ML; MG/100ML
INJECTION, SOLUTION INTRAVENOUS CONTINUOUS
Status: DISCONTINUED | OUTPATIENT
Start: 2022-09-08 | End: 2022-09-08 | Stop reason: HOSPADM

## 2022-09-08 RX ORDER — LIDOCAINE HYDROCHLORIDE 20 MG/ML
INJECTION, SOLUTION EPIDURAL; INFILTRATION; INTRACAUDAL; PERINEURAL
Status: DISCONTINUED | OUTPATIENT
Start: 2022-09-08 | End: 2022-09-08

## 2022-09-08 RX ORDER — LIDOCAINE HYDROCHLORIDE 10 MG/ML
1 INJECTION, SOLUTION EPIDURAL; INFILTRATION; INTRACAUDAL; PERINEURAL ONCE
Status: DISCONTINUED | OUTPATIENT
Start: 2022-09-08 | End: 2022-09-08 | Stop reason: HOSPADM

## 2022-09-08 RX ORDER — SUCCINYLCHOLINE CHLORIDE 20 MG/ML
INJECTION INTRAMUSCULAR; INTRAVENOUS
Status: DISCONTINUED | OUTPATIENT
Start: 2022-09-08 | End: 2022-09-08

## 2022-09-08 RX ORDER — PROPOFOL 10 MG/ML
VIAL (ML) INTRAVENOUS
Status: DISCONTINUED | OUTPATIENT
Start: 2022-09-08 | End: 2022-09-08

## 2022-09-08 RX ORDER — ONDANSETRON 2 MG/ML
4 INJECTION INTRAMUSCULAR; INTRAVENOUS DAILY PRN
Status: DISCONTINUED | OUTPATIENT
Start: 2022-09-08 | End: 2022-09-08 | Stop reason: HOSPADM

## 2022-09-08 RX ADMIN — SODIUM CHLORIDE, POTASSIUM CHLORIDE, SODIUM LACTATE AND CALCIUM CHLORIDE: 600; 310; 30; 20 INJECTION, SOLUTION INTRAVENOUS at 07:09

## 2022-09-08 RX ADMIN — SUCCINYLCHOLINE CHLORIDE 100 MG: 20 INJECTION, SOLUTION INTRAMUSCULAR; INTRAVENOUS at 09:09

## 2022-09-08 RX ADMIN — SODIUM CHLORIDE, POTASSIUM CHLORIDE, SODIUM LACTATE AND CALCIUM CHLORIDE: 600; 310; 30; 20 INJECTION, SOLUTION INTRAVENOUS at 09:09

## 2022-09-08 RX ADMIN — PROPOFOL 100 MG: 10 INJECTION, EMULSION INTRAVENOUS at 09:09

## 2022-09-08 RX ADMIN — LIDOCAINE HYDROCHLORIDE 50 MG: 20 INJECTION, SOLUTION EPIDURAL; INFILTRATION; INTRACAUDAL at 08:09

## 2022-09-08 RX ADMIN — PROPOFOL 100 MG: 10 INJECTION, EMULSION INTRAVENOUS at 08:09

## 2022-09-08 RX ADMIN — PROPOFOL 200 MG: 10 INJECTION, EMULSION INTRAVENOUS at 09:09

## 2022-09-08 NOTE — H&P
[facility] H&P Note    Subjective:       Patient ID: Lynne Lester is a 67 y.o. female.    Chief Complaint:  I need a screening colonoscopy.  HPI:  Lynne Lester is a 67 y.o. female presents today for evaluation of screening colonoscopy.    The patient has no hematochezia.    The patient has no family history of colon cancer.    The patient denies weight loss or bowel habit changes.  Last colonoscopy was over 10 years ago in [place].  She had a few polyps removed and was told to screen again in 10 years.    Past Medical History:   Diagnosis Date    Carpal tunnel syndrome     Bilateral    Hypertension     Hypothyroidism 2/17/2022    Osteoarthritis      Past Surgical History:   Procedure Laterality Date    APPENDECTOMY      HYSTERECTOMY      OOPHORECTOMY      TONSILLECTOMY       Family History   Problem Relation Age of Onset    Alzheimer's disease Mother     COPD Father     Cancer Paternal Grandmother         colon    Breast cancer Maternal Aunt      Social History     Socioeconomic History    Marital status:    Tobacco Use    Smoking status: Former    Smokeless tobacco: Never   Substance and Sexual Activity    Alcohol use: Yes    Drug use: Never       Current Facility-Administered Medications   Medication Dose Route Frequency Provider Last Rate Last Admin    0.9%  NaCl infusion   Intravenous Continuous Carine Dc MD         Review of patient's allergies indicates:   Allergen Reactions    Codeine Rash       10 point review of systems negative.    Objective:      There were no vitals filed for this visit.    Physical examination.  General well-developed well-nourished female in no acute distress.  Cardiovascular regular rate and rhythm.  Lungs nonlabored breathing, clear to auscultation bilateral  Abdomen soft nontender nondistended  Extremities no cyanosis clubbing or edema  Neuro afocal  Skin no rashes bruises or abrasions.         Assessment:  In need of screening  colonoscopy.    Plan:  Screening colonoscopy.    Medical Decision Making/Counseling:  Risk and benefits of screening colonoscopy have been discussed in detail with the patient.  Risk of bleeding (significant 1 in 500 cases), perforation (1 in 5000 cases), aspiration, need for further surgeries, need for admission to the hospital, need for blood transfusions etcetera have all been discussed.  During the procedure, small polyps (colon growths) will be removed and any inflammation irritation or masslike structures will be biopsied.  Patient voiced understanding, and agreement.  After informed discussion with the patient they voiced understanding of the risk benefits of the outlined procedure and desired to proceed.  All questions were answered to the patient's satisfaction.  They will be followed up if there is any pathology to be reviewed in surgery clinic in the next couple of weeks for evaluation

## 2022-09-08 NOTE — [employer] PATIENT INSTRUCTIONS
Discharge Summary/Instructions after an Endoscopic Procedure  Patient Name: Lynne Lester  Patient MRN: [id number]  Patient YOB: 1955 Thursday, September 8, 2022  Carine Dc MD  RESTRICTIONS:  During your procedure today, you received medications for sedation.  These   medications may affect your judgment, balance and coordination.  Therefore,   for 24 hours, you have the following restrictions:   - DO NOT drive a car, operate machinery, make legal/financial decisions,   sign important papers or drink alcohol.    ACTIVITY:  Today: no heavy lifting, straining or running due to procedural   sedation/anesthesia.  The following day: return to full activity including work.  DIET:  Eat and drink normally unless instructed otherwise.     TREATMENT FOR COMMON SIDE EFFECTS:  - Mild abdominal pain, nausea, belching, bloating or excessive gas:  rest,   eat lightly and use a heating pad.  - Sore Throat: treat with throat lozenges and/or gargle with warm salt   water.  - Because air was used during the procedure, expelling large amounts of air   from your rectum or belching is normal.  - If a bowel prep was taken, you may not have a bowel movement for 1-3 days.    This is normal.  SYMPTOMS TO WATCH FOR AND REPORT TO YOUR PHYSICIAN:  1. Abdominal pain or bloating, other than gas cramps.  2. Chest pain.  3. Back pain.  4. Signs of infection such as: chills or fever occurring within 24 hours   after the procedure.  5. Rectal bleeding, which would show as bright red, maroon, or black stools.   (A tablespoon of blood from the rectum is not serious, especially if   hemorrhoids are present.)  6. Vomiting.  7. Weakness or dizziness.  GO DIRECTLY TO THE NEAREST EMERGENCY ROOM IF YOU HAVE ANY OF THE FOLLOWING:      Difficulty breathing              Chills and/or fever over 101 F   Persistent vomiting and/or vomiting blood   Severe abdominal pain   Severe chest pain   Black, tarry stools   Bleeding- more than one  tablespoon   Any other symptom or condition that you feel may need urgent attention  Your doctor recommends these additional instructions:  If any biopsies were taken, your doctors clinic will contact you in 1 to 2   weeks with any results.  - Discharge patient to home.   - Resume previous diet.   - Continue present medications.   - Repeat colonoscopy in 10 years for screening purposes.  For questions, problems or results please call your physician - Carine Dc MD at Work:  [phone].  [facility] EMERGENCY ROOM PHONE NUMBER: [phone]  IF A COMPLICATION OR EMERGENCY SITUATION ARISES AND YOU ARE UNABLE TO REACH   YOUR PHYSICIAN - GO DIRECTLY TO THE EMERGENCY ROOM.  MD Carine Hanson MD  9/8/2022 10:12:34 AM  This report has been verified and signed electronically.  Dear patient,  As a result of recent federal legislation (The Federal Cures Act), you may   receive lab or pathology results from your procedure in your [facility]   account before your physician is able to contact you. Your physician or   their representative will relay the results to you with their   recommendations at their soonest availability.  Thank you,  [employer]ATION

## 2022-09-08 NOTE — PLAN OF CARE
Has met unit/department guidelines for discharge from each phase of the post procedure continuum. Leaving floor per w/c with [facility]. AMANDA x3. Resp even and unlabored room air. No distress noted. Encouraging PO fluids. Denies need for anything to drink. All personal belongings returned to pt.

## 2022-09-08 NOTE — DISCHARGE INSTRUCTIONS

## 2022-09-08 NOTE — TRANSFER OF CARE
"Anesthesia Transfer of Care Note    Patient: Lynne Lester    Procedure(s) Performed: Procedure(s) (LRB):  COLONOSCOPY (N/A)    Patient location: PACU    Anesthesia Type: general    Transport from OR: Transported from OR on room air with adequate spontaneous ventilation    Post pain: adequate analgesia    Post assessment: no apparent anesthetic complications and tolerated procedure well    Post vital signs: stable    Level of consciousness: awake, alert and oriented    Nausea/Vomiting: no nausea/vomiting    Complications: none    Transfer of care protocol was followed      Last vitals:   Visit Vitals  /83 (BP Location: Left arm, Patient Position: Sitting)   Pulse 71   Temp 36.5 °C (97.7 °F)   Resp 14   Ht 5' 7" (1.702 m)   Wt 84.8 kg (187 lb)   SpO2 99%   Breastfeeding No   BMI 29.29 kg/m²     "

## 2022-09-08 NOTE — ANESTHESIA PREPROCEDURE EVALUATION
09/08/2022  Lynne Lester is a 67 y.o., female.      Pre-op Assessment    I have reviewed the Patient Summary Reports.     I have reviewed the Nursing Notes. I have reviewed the NPO Status.   I have reviewed the Medications.     Review of Systems  Social:  Non-Smoker    Hematology/Oncology:  Hematology Normal   Oncology Normal     EENT/Dental:EENT/Dental Normal   Cardiovascular:   Hypertension hyperlipidemia    Pulmonary:  Pulmonary Normal    Renal/:  Renal/ Normal     Hepatic/GI:  Hepatic/GI Normal    Musculoskeletal:   Arthritis     Neurological:   Headaches    Endocrine:   Hypothyroidism        Physical Exam    Airway:  Mallampati: II   Mouth Opening: Normal  TM Distance: Normal  Tongue: Normal  Neck ROM: Normal ROM    Chest/Lungs:  Clear to auscultation    Heart:  Rate: Normal  Rhythm: Regular Rhythm        Anesthesia Plan  Type of Anesthesia, risks & benefits discussed:    Anesthesia Type: Gen Natural Airway  Intra-op Monitoring Plan: Standard ASA Monitors  Post Op Pain Control Plan: multimodal analgesia  Induction:  IV  Informed Consent: Informed consent signed with the Patient and all parties understand the risks and agree with anesthesia plan.  All questions answered.   ASA Score: 2  Day of Surgery Review of History & Physical: H&P Update referred to the surgeon/provider.    Ready For Surgery From Anesthesia Perspective.     .

## 2022-09-08 NOTE — ANESTHESIA PROCEDURE NOTES
Intubation    Date/Time: 9/8/2022 9:30 AM  Performed by: Atiya Anderson CRNA  Authorized by: Eduardo Harris MD     Intubation:     Induction:  Rapid sequence induction    Intubated:  Postinduction    Mask Ventilation:  Easy mask    Attempts:  1    Attempted By:  CRNA    Method of Intubation:  Direct    Blade:  Momo 3    Laryngeal View Grade: Grade I - full view of cords      Difficult Airway Encountered?: No      Complications:  None and reflux of gastric contents - no apparent aspiration    Airway Device:  Oral endotracheal tube    Airway Device Size:  8.5    Style/Cuff Inflation:  Cuffed    Tube secured:  22    Secured at:  The teeth    Placement Verified By:  Capnometry    Complicating Factors:  None    Findings Post-Intubation:  BS equal bilateral

## 2022-09-08 NOTE — ANESTHESIA POSTPROCEDURE EVALUATION
Anesthesia Post Evaluation    Patient: Lynne Lester    Procedure(s) Performed: Procedure(s) (LRB):  COLONOSCOPY (N/A)    Final Anesthesia Type: general      Patient location during evaluation: PACU  Patient participation: Yes- Able to Participate  Level of consciousness: awake and alert  Post-procedure vital signs: reviewed and stable  Pain management: adequate  Airway patency: patent    PONV status at discharge: No PONV  Anesthetic complications: no      Cardiovascular status: blood pressure returned to baseline  Respiratory status: unassisted  Hydration status: euvolemic  Follow-up not needed.          Vitals Value Taken Time   /89 09/08/22 1033   Temp 36.5 °C (97.7 °F) 09/08/22 0950   Pulse 76 09/08/22 1041   Resp 18 09/08/22 1046   SpO2 76 % 09/08/22 1029   Vitals shown include unvalidated device data.      Event Time   Out of Recovery 10:20:00         Pain/Nabila Score: Nabila Score: 10 (9/8/2022 10:20 AM)  Modified Nabila Score: 19 (9/8/2022 10:50 AM)

## 2022-09-14 ENCOUNTER — HOSPITAL ENCOUNTER (OUTPATIENT)
Dept: RADIOLOGY | Facility: HOSPITAL | Age: 67
Discharge: HOME | End: 2022-09-14
Attending: FAMILY MEDICINE
Payer: MEDICARE

## 2022-09-14 PROCEDURE — 77080 DXA BONE DENSITY AXIAL: CPT | Mod: TC

## 2022-09-14 PROCEDURE — 77080 DXA BONE DENSITY AXIAL: CPT | Mod: 26,,, | Performed by: RADIOLOGY

## 2022-09-14 PROCEDURE — 77080 DEXA BONE DENSITY SPINE HIP: ICD-10-PCS | Mod: 26,,, | Performed by: RADIOLOGY

## 2022-09-20 ENCOUNTER — OFFICE VISIT (OUTPATIENT)
Dept: FAMILY MEDICINE | Facility: CLINIC | Age: 67
End: 2022-09-20
Payer: MEDICARE

## 2022-09-20 VITALS
SYSTOLIC BLOOD PRESSURE: 124 MMHG | HEART RATE: 74 BPM | TEMPERATURE: 99 F | RESPIRATION RATE: 16 BRPM | BODY MASS INDEX: 29.7 KG/M2 | OXYGEN SATURATION: 97 % | HEIGHT: 67 IN | WEIGHT: 189.19 LBS | DIASTOLIC BLOOD PRESSURE: 78 MMHG

## 2022-09-20 DIAGNOSIS — R29.898 WEAKNESS OF BOTH LEGS: Primary | ICD-10-CM

## 2022-09-20 DIAGNOSIS — G89.29 BILATERAL CHRONIC KNEE PAIN: ICD-10-CM

## 2022-09-20 DIAGNOSIS — M25.561 BILATERAL CHRONIC KNEE PAIN: ICD-10-CM

## 2022-09-20 DIAGNOSIS — M25.562 BILATERAL CHRONIC KNEE PAIN: ICD-10-CM

## 2022-09-20 PROCEDURE — 1101F PR PT FALLS ASSESS DOC 0-1 FALLS W/OUT INJ PAST YR: ICD-10-PCS | Mod: CPTII,S$GLB,, | Performed by: NURSE PRACTITIONER

## 2022-09-20 PROCEDURE — 99999 PR PBB SHADOW E&M-EST. PATIENT-LVL IV: CPT | Mod: PBBFAC,,, | Performed by: NURSE PRACTITIONER

## 2022-09-20 PROCEDURE — 99214 OFFICE O/P EST MOD 30 MIN: CPT | Mod: S$GLB,,, | Performed by: NURSE PRACTITIONER

## 2022-09-20 PROCEDURE — 1125F AMNT PAIN NOTED PAIN PRSNT: CPT | Mod: CPTII,S$GLB,, | Performed by: NURSE PRACTITIONER

## 2022-09-20 PROCEDURE — 1125F PR PAIN SEVERITY QUANTIFIED, PAIN PRESENT: ICD-10-PCS | Mod: CPTII,S$GLB,, | Performed by: NURSE PRACTITIONER

## 2022-09-20 PROCEDURE — 3008F PR BODY MASS INDEX (BMI) DOCUMENTED: ICD-10-PCS | Mod: CPTII,S$GLB,, | Performed by: NURSE PRACTITIONER

## 2022-09-20 PROCEDURE — 3288F FALL RISK ASSESSMENT DOCD: CPT | Mod: CPTII,S$GLB,, | Performed by: NURSE PRACTITIONER

## 2022-09-20 PROCEDURE — 1101F PT FALLS ASSESS-DOCD LE1/YR: CPT | Mod: CPTII,S$GLB,, | Performed by: NURSE PRACTITIONER

## 2022-09-20 PROCEDURE — 3044F HG A1C LEVEL LT 7.0%: CPT | Mod: CPTII,S$GLB,, | Performed by: NURSE PRACTITIONER

## 2022-09-20 PROCEDURE — 3074F PR MOST RECENT SYSTOLIC BLOOD PRESSURE < 130 MM HG: ICD-10-PCS | Mod: CPTII,S$GLB,, | Performed by: NURSE PRACTITIONER

## 2022-09-20 PROCEDURE — 1160F RVW MEDS BY RX/DR IN RCRD: CPT | Mod: CPTII,S$GLB,, | Performed by: NURSE PRACTITIONER

## 2022-09-20 PROCEDURE — 3074F SYST BP LT 130 MM HG: CPT | Mod: CPTII,S$GLB,, | Performed by: NURSE PRACTITIONER

## 2022-09-20 PROCEDURE — 99214 PR OFFICE/OUTPT VISIT, EST, LEVL IV, 30-39 MIN: ICD-10-PCS | Mod: S$GLB,,, | Performed by: NURSE PRACTITIONER

## 2022-09-20 PROCEDURE — 1160F PR REVIEW ALL MEDS BY PRESCRIBER/CLIN PHARMACIST DOCUMENTED: ICD-10-PCS | Mod: CPTII,S$GLB,, | Performed by: NURSE PRACTITIONER

## 2022-09-20 PROCEDURE — 1159F PR MEDICATION LIST DOCUMENTED IN MEDICAL RECORD: ICD-10-PCS | Mod: CPTII,S$GLB,, | Performed by: NURSE PRACTITIONER

## 2022-09-20 PROCEDURE — 3078F DIAST BP <80 MM HG: CPT | Mod: CPTII,S$GLB,, | Performed by: NURSE PRACTITIONER

## 2022-09-20 PROCEDURE — 3078F PR MOST RECENT DIASTOLIC BLOOD PRESSURE < 80 MM HG: ICD-10-PCS | Mod: CPTII,S$GLB,, | Performed by: NURSE PRACTITIONER

## 2022-09-20 PROCEDURE — 3044F PR MOST RECENT HEMOGLOBIN A1C LEVEL <7.0%: ICD-10-PCS | Mod: CPTII,S$GLB,, | Performed by: NURSE PRACTITIONER

## 2022-09-20 PROCEDURE — 1159F MED LIST DOCD IN RCRD: CPT | Mod: CPTII,S$GLB,, | Performed by: NURSE PRACTITIONER

## 2022-09-20 PROCEDURE — 3008F BODY MASS INDEX DOCD: CPT | Mod: CPTII,S$GLB,, | Performed by: NURSE PRACTITIONER

## 2022-09-20 PROCEDURE — 99999 PR PBB SHADOW E&M-EST. PATIENT-LVL IV: ICD-10-PCS | Mod: PBBFAC,,, | Performed by: NURSE PRACTITIONER

## 2022-09-20 PROCEDURE — 3288F PR FALLS RISK ASSESSMENT DOCUMENTED: ICD-10-PCS | Mod: CPTII,S$GLB,, | Performed by: NURSE PRACTITIONER

## 2022-09-20 RX ORDER — PREDNISONE 20 MG/1
TABLET ORAL
Qty: 22 TABLET | Refills: 0 | Status: SHIPPED | OUTPATIENT
Start: 2022-09-20 | End: 2023-01-30

## 2022-09-20 NOTE — PROGRESS NOTES
Subjective:       Patient ID: Lynne Lester is a 67 y.o. female.    Chief Complaint: Leg Pain (Pt states she is having leg/thigh muscle pain it has been going on for years but over the last year it has gotten worse pt has seen Dr Alford in ortho and thinks this issue may be from her knee pain) and Muscle Pain  -  The patient is a 66 y/o female new to this provider presents with c/o bilateral thigh weakness x 1 yr.  She has bilateral knee pain told she needs bilateral sx and has bursitis, right worse than left. It takes her about 10+ steps to get her gait balanced and is aware of abnormal gait. She is under the care of Ortho, Dr. Alford thus says she will make an appt and consider sx. She did PT about 6 mo ago with little relief and injections are only helpful for a week.   -    Past Medical History:   Diagnosis Date    Carpal tunnel syndrome     Bilateral    Hypertension     Hypothyroidism 2/17/2022    Osteoarthritis        Past Surgical History:   Procedure Laterality Date    APPENDECTOMY      BLADDER REPAIR      CATARACT EXTRACTION, BILATERAL      COLONOSCOPY N/A 9/8/2022    Procedure: COLONOSCOPY;  Surgeon: Carine Dc MD;  [facility];  Service: General;  Laterality: N/A;    HYSTERECTOMY      OOPHORECTOMY      TONSILLECTOMY          Social History     Socioeconomic History    Marital status:    Tobacco Use    Smoking status: Former    Smokeless tobacco: Never   Substance and Sexual Activity    Alcohol use: Yes    Drug use: Never       Family History   Problem Relation Age of Onset    Alzheimer's disease Mother     COPD Father     Cancer Paternal Grandmother         colon    Breast cancer Maternal Aunt        Review of patient's allergies indicates:   Allergen Reactions    Codeine Rash          Current Outpatient Medications:     atorvastatin (LIPITOR) 40 MG tablet, Take 1 tablet (40 mg total) by mouth every evening., Disp: 90 tablet, Rfl: 0    cholecalciferol, vitamin D3, 125 mcg  (5,000 unit) capsule, Take 5,000 Units by mouth., Disp: , Rfl:     FLUoxetine 20 MG capsule, Take 1 capsule (20 mg total) by mouth once daily., Disp: 90 capsule, Rfl: 0    ibandronate (BONIVA) 150 mg tablet, Take 1 tablet (150 mg total) by mouth every 30 days., Disp: 90 tablet, Rfl: 0    levothyroxine (SYNTHROID) 88 MCG tablet, Take 1 tablet (88 mcg total) by mouth before breakfast., Disp: 90 tablet, Rfl: 0    meloxicam (MOBIC) 15 MG tablet, Take 1 tablet (15 mg total) by mouth once daily., Disp: 90 tablet, Rfl: 0    predniSONE (DELTASONE) 20 MG tablet, 60 mg po q am x 3 days then 40 mg po q am x 3 days then 20 mg q am x 2 days then stop, Disp: 22 tablet, Rfl: 0    propranoloL (INDERAL LA) 60 MG 24 hr capsule, Take 1 capsule (60 mg total) by mouth once daily., Disp: 90 capsule, Rfl: 0    sumatriptan (IMITREX) 100 MG tablet, Take 1 tablet (100 mg total) by mouth every 2 (two) hours as needed for Migraine., Disp: 30 tablet, Rfl: 0    sumatriptan (IMITREX) 50 MG tablet, sumatriptan 50 mg tablet, Disp: , Rfl:     Leg Pain     Muscle Pain  Review of Systems   Constitutional: Negative.    HENT: Negative.     Eyes: Negative.    Respiratory: Negative.     Cardiovascular: Negative.    Gastrointestinal: Negative.    Endocrine: Negative.    Genitourinary: Negative.    Musculoskeletal:         Bilateral thigh weakness   Skin: Negative.    Allergic/Immunologic: Negative.    Neurological: Negative.    Hematological: Negative.    Psychiatric/Behavioral: Negative.       Objective:      Physical Exam  Vitals and nursing note reviewed.   Constitutional:       General: She is not in acute distress.     Appearance: Normal appearance. She is well-developed. She is obese. She is not ill-appearing.   HENT:      Head: Normocephalic.   Eyes:      Conjunctiva/sclera: Conjunctivae normal.   Neck:      Thyroid: No thyromegaly.   Cardiovascular:      Rate and Rhythm: Normal rate and regular rhythm.      Heart sounds: Normal heart sounds. No  murmur heard.  Pulmonary:      Effort: Pulmonary effort is normal.      Breath sounds: Normal breath sounds. No wheezing or rales.   Musculoskeletal:         General: Normal range of motion.      Cervical back: Normal range of motion.      Right lower leg: No edema.      Left lower leg: No edema.   Skin:     General: Skin is warm and dry.   Neurological:      Mental Status: She is alert and oriented to person, place, and time. Mental status is at baseline.      Gait: Gait abnormal.      Comments: Limping with the first 10 steps or so.   Psychiatric:         Mood and Affect: Mood normal.         Behavior: Behavior normal.         Thought Content: Thought content normal.         Judgment: Judgment normal.       Assessment:       1. Weakness of both legs    2. Bilateral chronic knee pain        Plan:     1- prednisone.     Weakness of both legs  -     predniSONE (DELTASONE) 20 MG tablet; 60 mg po q am x 3 days then 40 mg po q am x 3 days then 20 mg q am x 2 days then stop  Dispense: 22 tablet; Refill: 0    Bilateral chronic knee pain  -     predniSONE (DELTASONE) 20 MG tablet; 60 mg po q am x 3 days then 40 mg po q am x 3 days then 20 mg q am x 2 days then stop  Dispense: 22 tablet; Refill: 0      Risks, benefits, and side effects were discussed with the patient. All questions were answered to the fullest satisfaction of the patient, and pt verbalized understanding and agreement to treatment plan. Pt was to call with any new or worsening symptoms, or present to the ER.

## 2022-10-12 ENCOUNTER — DOCUMENTATION ONLY (OUTPATIENT)
Dept: REHABILITATION | Facility: HOSPITAL | Age: 67
End: 2022-10-12
Payer: COMMERCIAL

## 2022-10-12 DIAGNOSIS — M25.561 CHRONIC PAIN OF RIGHT KNEE: Primary | ICD-10-CM

## 2022-10-12 DIAGNOSIS — Z74.09 IMPAIRED FUNCTIONAL MOBILITY AND ACTIVITY TOLERANCE: ICD-10-CM

## 2022-10-12 DIAGNOSIS — G89.29 CHRONIC PAIN OF RIGHT KNEE: Primary | ICD-10-CM

## 2022-10-12 NOTE — PROGRESS NOTES
[facility]  PT Discharge Note    Name: Lynne Lester  Clinic Number: [id number]    Therapy Diagnosis:   Encounter Diagnoses   Name Primary?    Chronic pain of right knee Yes    Impaired functional mobility and activity tolerance      Physician: Tyler Moreno MD     Physician Orders: PT Eval and Treat   Medical Diagnosis from Referral: Chronic pain of R knee  Evaluation Date: 4/14/2022      Date of Last visit: 5/2/2022  Total Visits Received: 15    ASSESSMENT      Patient did not complete POC as established in POC/updated POC.  See daily treatment notes and updated POC for progression    Discharge reason: Patient has not attended therapy since 5/2/2022    Discharge FOTO Score: 43% residual deficit    Goals: as indicated in final treatment note  Short Term Goals:               1) Facilitate improved LE flexibility 80% MET               2) Facilitate improved R patellar mobility MET              3) Decreased R knee tenderness to minimal Partially MET              4) Decrease swelling of R knee by 1/2 cm @ each affected level Not MET     Long Term Goals:                1) Patient will consistently walk > 30 min at a time without increased knee pain (80% MET)              2) Patient will consistently navigate 1 flight of steps utilizing reciprocating gait pattern with minimal increase in knee pain NOT MET              3) Patient will consistently sleep > 6 hours without interruption by knee pain 75% MET              4) Patient will consistently perform sit <> stand transfers safely with minimal UE support Progressing              5) Improve functional deficit to < 30% as indicated by FOTO knee survey Slight progress (43%)              6) Patient will be independent in complete HEP Progressing               7) NEW GOAL UPDATED 4/14  Pt reduce 10x STS time from 34 sec to 26 sec to show improvements in functional strength.    PLAN   This patient is discharged from Physical  Therapy      Vibha Ricketts, PT

## 2022-10-31 DIAGNOSIS — G89.29 CHRONIC PAIN OF RIGHT KNEE: Primary | ICD-10-CM

## 2022-10-31 DIAGNOSIS — M25.561 CHRONIC PAIN OF RIGHT KNEE: Primary | ICD-10-CM

## 2022-11-09 ENCOUNTER — HOSPITAL ENCOUNTER (OUTPATIENT)
Dept: RADIOLOGY | Facility: HOSPITAL | Age: 67
Discharge: HOME | End: 2022-11-09
Attending: ORTHOPAEDIC SURGERY
Payer: MEDICARE

## 2022-11-09 ENCOUNTER — OFFICE VISIT (OUTPATIENT)
Dept: ORTHOPEDICS | Facility: CLINIC | Age: 67
End: 2022-11-09
Payer: MEDICARE

## 2022-11-09 VITALS — WEIGHT: 189.13 LBS | HEIGHT: 67 IN | RESPIRATION RATE: 18 BRPM | BODY MASS INDEX: 29.69 KG/M2

## 2022-11-09 DIAGNOSIS — M25.561 CHRONIC PAIN OF BOTH KNEES: ICD-10-CM

## 2022-11-09 DIAGNOSIS — M25.561 CHRONIC PAIN OF RIGHT KNEE: ICD-10-CM

## 2022-11-09 DIAGNOSIS — M17.0 PRIMARY OSTEOARTHRITIS OF BOTH KNEES: Primary | ICD-10-CM

## 2022-11-09 DIAGNOSIS — G89.29 CHRONIC PAIN OF BOTH KNEES: ICD-10-CM

## 2022-11-09 DIAGNOSIS — M71.22 BAKER CYST, LEFT: ICD-10-CM

## 2022-11-09 DIAGNOSIS — Z01.818 PRE-OP TESTING: ICD-10-CM

## 2022-11-09 DIAGNOSIS — M25.562 CHRONIC PAIN OF BOTH KNEES: ICD-10-CM

## 2022-11-09 DIAGNOSIS — G89.29 CHRONIC PAIN OF RIGHT KNEE: ICD-10-CM

## 2022-11-09 DIAGNOSIS — M71.21 BAKER CYST, RIGHT: ICD-10-CM

## 2022-11-09 PROCEDURE — 99999 PR PBB SHADOW E&M-EST. PATIENT-LVL III: ICD-10-PCS | Mod: PBBFAC,,, | Performed by: ORTHOPAEDIC SURGERY

## 2022-11-09 PROCEDURE — 73562 X-RAY EXAM OF KNEE 3: CPT | Mod: TC,RT

## 2022-11-09 PROCEDURE — 3044F HG A1C LEVEL LT 7.0%: CPT | Mod: CPTII,S$GLB,, | Performed by: ORTHOPAEDIC SURGERY

## 2022-11-09 PROCEDURE — 99999 PR PBB SHADOW E&M-EST. PATIENT-LVL III: CPT | Mod: PBBFAC,,, | Performed by: ORTHOPAEDIC SURGERY

## 2022-11-09 PROCEDURE — 99214 OFFICE O/P EST MOD 30 MIN: CPT | Mod: 57,S$GLB,, | Performed by: ORTHOPAEDIC SURGERY

## 2022-11-09 PROCEDURE — 1125F AMNT PAIN NOTED PAIN PRSNT: CPT | Mod: CPTII,S$GLB,, | Performed by: ORTHOPAEDIC SURGERY

## 2022-11-09 PROCEDURE — 3044F PR MOST RECENT HEMOGLOBIN A1C LEVEL <7.0%: ICD-10-PCS | Mod: CPTII,S$GLB,, | Performed by: ORTHOPAEDIC SURGERY

## 2022-11-09 PROCEDURE — 3008F BODY MASS INDEX DOCD: CPT | Mod: CPTII,S$GLB,, | Performed by: ORTHOPAEDIC SURGERY

## 2022-11-09 PROCEDURE — 99214 PR OFFICE/OUTPT VISIT, EST, LEVL IV, 30-39 MIN: ICD-10-PCS | Mod: 57,S$GLB,, | Performed by: ORTHOPAEDIC SURGERY

## 2022-11-09 PROCEDURE — 1159F PR MEDICATION LIST DOCUMENTED IN MEDICAL RECORD: ICD-10-PCS | Mod: CPTII,S$GLB,, | Performed by: ORTHOPAEDIC SURGERY

## 2022-11-09 PROCEDURE — 1159F MED LIST DOCD IN RCRD: CPT | Mod: CPTII,S$GLB,, | Performed by: ORTHOPAEDIC SURGERY

## 2022-11-09 PROCEDURE — 3008F PR BODY MASS INDEX (BMI) DOCUMENTED: ICD-10-PCS | Mod: CPTII,S$GLB,, | Performed by: ORTHOPAEDIC SURGERY

## 2022-11-09 PROCEDURE — 1101F PR PT FALLS ASSESS DOC 0-1 FALLS W/OUT INJ PAST YR: ICD-10-PCS | Mod: CPTII,S$GLB,, | Performed by: ORTHOPAEDIC SURGERY

## 2022-11-09 PROCEDURE — 3288F PR FALLS RISK ASSESSMENT DOCUMENTED: ICD-10-PCS | Mod: CPTII,S$GLB,, | Performed by: ORTHOPAEDIC SURGERY

## 2022-11-09 PROCEDURE — 73562 XR KNEE 3 VIEW RIGHT: ICD-10-PCS | Mod: 26,RT,, | Performed by: RADIOLOGY

## 2022-11-09 PROCEDURE — 3288F FALL RISK ASSESSMENT DOCD: CPT | Mod: CPTII,S$GLB,, | Performed by: ORTHOPAEDIC SURGERY

## 2022-11-09 PROCEDURE — 1101F PT FALLS ASSESS-DOCD LE1/YR: CPT | Mod: CPTII,S$GLB,, | Performed by: ORTHOPAEDIC SURGERY

## 2022-11-09 PROCEDURE — 1125F PR PAIN SEVERITY QUANTIFIED, PAIN PRESENT: ICD-10-PCS | Mod: CPTII,S$GLB,, | Performed by: ORTHOPAEDIC SURGERY

## 2022-11-09 PROCEDURE — 73562 X-RAY EXAM OF KNEE 3: CPT | Mod: 26,RT,, | Performed by: RADIOLOGY

## 2022-11-09 NOTE — PROGRESS NOTES
Patient ID: Lynne Lester is a 65 y.o. female.     Chief Complaint: Pain of the Right Knee     Referring Provider: Fabien Self  No address on file     HPI:  Ms. Lester is a 67-year-old lady who returns today with complaints of increasing pain in both of her knees.  Her right is worse than her left.  Her pain began to increase over the last 2-3 months and has gotten to the point where it is giving her issues with ambulation.  She has had approximately 7 years of knee pain  She has a history of falling multiple times in the past.  Walking and driving increase her symptoms while sitting and rest decreases them.  She stated, I can hear it crunching .  She denies swelling but stated she gets giving way and occasional locking.  She has taken NSAIDs with help.  She has also worn a brace with help.  She has had steroid injections as well as viscosupplementation to her right knee which although gave her some relief is no longer given her help.  Currently she can ambulate approximately 1 mi and climb approximately 2-3 stairs.  She does not use an ambulatory assistive device.          Past Medical History:   Diagnosis Date    Carpal tunnel syndrome bilateral     * Hyperlipidemia    Hypertension       *  *  *  *  * Osteoarthritis  Seasonal allergies  Depression  MS  Headaches  Psoriasis              Past Surgical History:   Procedure Laterality Date    APPENDECTOMY         *  *  *  *  *  *  * TONSILLECTOMY  HEMORRHOIDECTOMY  TUBAL LIGATION  HYSTERECTOMY  COLONOSCOPY  CATARACT REMOVAL BOTH EYES  RECTAL TUCK  BLADDER TUCK             Review of patient's allergies indicates:  No Known Allergies     Social History           Occupational History    Nurse   Tobacco Use    Smoking status: Former Smoker   Substance and Sexual Activity    Alcohol use: Yes    Drug use: Never    Sexual activity: Not on file            Family History   Problem Relation Age of Onset    Alzheimer's disease Mother      COPD Father            Previous Hospitalizations:  Childbirth, appendicitis.     ROS:  No new diagnosis/surgery/prescriptions since last office visit on 06/01/2022.    Constitutional: Negative for chills and fever.   HENT: Negative for congestion.    Eyes: Negative for blurred vision.   Cardiovascular: Negative for chest pain.   Respiratory: Negative for cough.    Endocrine: Negative for polydipsia.   Hematologic/Lymphatic: Negative for adenopathy.   Skin: Negative for flushing and itching.   Musculoskeletal: Positive for joint pain. Negative for gout.   Gastrointestinal: Negative for constipation, diarrhea and heartburn.   Genitourinary: Negative for nocturia.   Neurological: Positive for headaches. Negative for seizures.   Psychiatric/Behavioral: Positive for depression. Negative for substance abuse. The patient is not nervous/anxious.    Allergic/Immunologic: Positive for environmental allergies.             Objective:   Physical Exam:   General: AAOx3.  No acute distress  HEENT: Normocephalic, PEARLA EOMI.  Fair dentition  Neck: Supple, No JVD  Chest: Symetric, equal excursion on inspiration  Abdomen: Soft NTND  Vascular:  Pulses intact and equal bilaterally.  Capillary refill less than 3 seconds and equal bilaterally  Neurologic:  Pinprick and soft touch intact and equal bilaterally  Integment:  No ecchymosis, no errythema  Extremity:  Knee:  Extension/flexion equal bilaterally 1/118 degrees.  Baker cyst both knees.  Positive drop home both knees.  Valgus alignment both knees.  Mild effusion right knee.  Crepitus with motion both knees.  Mildly positive patellar load/compression right knee.  Negative patella apprehension/relocation both knees.  Mild increased excursion with valgus stressing both knees.  Varus stressing equal bilaterally with endpoint.  Lachman's/drawer equal bilaterally with endpoint.  Positive medial joint line tenderness both knee  Jessica negative both knees.  Evan positive both knees  Nontender at the  anserine insertion bilaterally.  No swelling at the anserine insertion bilaterally.  Radiography:  Personally reviewed x-rays of the right knee completed on 11/09/2022 showed severe tricompartmental arthritic changes with bone-on-bone articulation and osteophytes.  Mild varus alignment.      Assessment:       Impression:       1. Symptomatic tricompartmental arthritis, both knee   2.   3.  Baker cyst, both knee  Chronic bilateral knee pain          Plan:       1.  Discussed physical examination and radiographic findings with the patient. Lynne understands that she has advanced tricompartmental arthritis of her knees  Treatment alternatives and outcomes were discussed with the patient she understands she could be treated conservatively with observation, activity modification, NSAIDs, bracing, physical therapy, injections, or she could consider surgical intervention such as arthroscopy versus total knee arthroplasty.  At the point where her arthritis in her knee is so advanced it is unlikely that arthroscopy would help her and if she were to consider surgical intervention total knee arthroplasty would most likely be our best option.  She states she would like to proceed with surgery on her right knee and would like to have a total knee arthroplasty.  With her left knee she was wondering if she could be treated with viscosupplementation as she has had a good results with that in the past.  She understands it needs to be preauthorized before injection can be given.  2.  Possible complications of surgery to include bleeding, infection, scarring, nerve/blood vessel/tendon damage, need for further surgery, failed surgery, failure to improve, possible persistent pain, possible arthrofibrosis, possible hardware failure, possible hardware breakage, possible leg length discrepancy, possible malrotation, and possible amputation were discussed with the patient.  The patient was permitted to ask questions and all concerns were  addressed to her satisfaction.    3. Consent for right total knee arthroplasty.  4. Tentatively schedule surgery for 01/09/2022.    5. Continue with NSAIDs as tolerated allowed by PCM.    6. Submit for for preauthorization for viscosupplementation to the left knee.  7. Continue with brace wear as previously shown discussed.    8. Continue with home exercises such as quadriceps and hamstring strengthening.    9. Follow up when viscosupplementation is preauthorized for injection.  She also know she needs to follow up approximately 1 week prior to surgery to update her surgical packet

## 2022-11-09 NOTE — H&P
Chief Complaint: Pain of the Right Knee     Referring Provider: Aaareferral Self  No address on file     HPI:  Ms. Lester is a 67-year-old lady who returns today with complaints of increasing pain in both of her knees.  Her right is worse than her left.  Her pain began to increase over the last 2-3 months and has gotten to the point where it is giving her issues with ambulation.  She has had approximately 7 years of knee pain  She has a history of falling multiple times in the past.  Walking and driving increase her symptoms while sitting and rest decreases them.  She stated, I can hear it crunching .  She denies swelling but stated she gets giving way and occasional locking.  She has taken NSAIDs with help.  She has also worn a brace with help.  She has had steroid injections as well as viscosupplementation to her right knee which although gave her some relief is no longer given her help.  Currently she can ambulate approximately 1 mi and climb approximately 2-3 stairs.  She does not use an ambulatory assistive device.          Past Medical History:   Diagnosis Date    Carpal tunnel syndrome bilateral     * Hyperlipidemia    Hypertension       *  *  *  *  * Osteoarthritis  Seasonal allergies  Depression  MS  Headaches  Psoriasis              Past Surgical History:   Procedure Laterality Date    APPENDECTOMY         *  *  *  *  *  *  * TONSILLECTOMY  HEMORRHOIDECTOMY  TUBAL LIGATION  HYSTERECTOMY  COLONOSCOPY  CATARACT REMOVAL BOTH EYES  RECTAL TUCK  BLADDER TUCK             Review of patient's allergies indicates:  No Known Allergies     Social History           Occupational History    Nurse   Tobacco Use    Smoking status: Former Smoker   Substance and Sexual Activity    Alcohol use: Yes    Drug use: Never    Sexual activity: Not on file            Family History   Problem Relation Age of Onset    Alzheimer's disease Mother      COPD Father           Previous Hospitalizations:  Childbirth, appendicitis.      ROS:  No new diagnosis/surgery/prescriptions since last office visit on 06/01/2022.    Constitutional: Negative for chills and fever.   HENT: Negative for congestion.    Eyes: Negative for blurred vision.   Cardiovascular: Negative for chest pain.   Respiratory: Negative for cough.    Endocrine: Negative for polydipsia.   Hematologic/Lymphatic: Negative for adenopathy.   Skin: Negative for flushing and itching.   Musculoskeletal: Positive for joint pain. Negative for gout.   Gastrointestinal: Negative for constipation, diarrhea and heartburn.   Genitourinary: Negative for nocturia.   Neurological: Positive for headaches. Negative for seizures.   Psychiatric/Behavioral: Positive for depression. Negative for substance abuse. The patient is not nervous/anxious.    Allergic/Immunologic: Positive for environmental allergies.             Objective:   Physical Exam:   General: AAOx3.  No acute distress  HEENT: Normocephalic, PEARLA EOMI.  Fair dentition  Neck: Supple, No JVD  Chest: Symetric, equal excursion on inspiration  Abdomen: Soft NTND  Vascular:  Pulses intact and equal bilaterally.  Capillary refill less than 3 seconds and equal bilaterally  Neurologic:  Pinprick and soft touch intact and equal bilaterally  Integment:  No ecchymosis, no errythema  Extremity:  Knee:  Extension/flexion equal bilaterally 1/118 degrees.  Baker cyst both knees.  Positive drop home both knees.  Valgus alignment both knees.  Mild effusion right knee.  Crepitus with motion both knees.  Mildly positive patellar load/compression right knee.  Negative patella apprehension/relocation both knees.  Mild increased excursion with valgus stressing both knees.  Varus stressing equal bilaterally with endpoint.  Lachman's/drawer equal bilaterally with endpoint.  Positive medial joint line tenderness both knee  Jessica negative both knees.  [place] positive both knees  Nontender at the anserine insertion bilaterally.  No swelling at the anserine  insertion bilaterally.  Radiography:  Personally reviewed x-rays of the right knee completed on 11/09/2022 showed severe tricompartmental arthritic changes with bone-on-bone articulation and osteophytes.  Mild varus alignment.      Assessment:       Impression:       1. Symptomatic tricompartmental arthritis, both knee   2.   3.  Baker cyst, both knee  Chronic bilateral knee pain          Plan:       1.  Discussed physical examination and radiographic findings with the patient. Lynne understands that she has advanced tricompartmental arthritis of her knees  Treatment alternatives and outcomes were discussed with the patient she understands she could be treated conservatively with observation, activity modification, NSAIDs, bracing, physical therapy, injections, or she could consider surgical intervention such as arthroscopy versus total knee arthroplasty.  At the point where her arthritis in her knee is so advanced it is unlikely that arthroscopy would help her and if she were to consider surgical intervention total knee arthroplasty would most likely be our best option.  She states she would like to proceed with surgery on her right knee and would like to have a total knee arthroplasty.  With her left knee she was wondering if she could be treated with viscosupplementation as she has had a good results with that in the past.  She understands it needs to be preauthorized before injection can be given.  2.  Possible complications of surgery to include bleeding, infection, scarring, nerve/blood vessel/tendon damage, need for further surgery, failed surgery, failure to improve, possible persistent pain, possible arthrofibrosis, possible hardware failure, possible hardware breakage, possible leg length discrepancy, possible malrotation, and possible amputation were discussed with the patient.  The patient was permitted to ask questions and all concerns were addressed to her satisfaction.    3. Consent for right total  knee arthroplasty.  4. Tentatively schedule surgery for 01/09/2022.    5. Continue with NSAIDs as tolerated allowed by PCM.    6. Submit for for preauthorization for viscosupplementation to the left knee.  7. Continue with brace wear as previously shown discussed.    8. Continue with home exercises such as quadriceps and hamstring strengthening.    9. Follow up when viscosupplementation is preauthorized for injection.  She also know she needs to follow up approximately 1 week prior to surgery to update her surgical packet

## 2022-12-30 ENCOUNTER — HOSPITAL ENCOUNTER (OUTPATIENT)
Dept: RADIOLOGY | Facility: HOSPITAL | Age: 67
Discharge: HOME | End: 2022-12-30
Attending: ORTHOPAEDIC SURGERY
Payer: COMMERCIAL

## 2022-12-30 ENCOUNTER — TELEPHONE (OUTPATIENT)
Dept: ORTHOPEDICS | Facility: CLINIC | Age: 67
End: 2022-12-30

## 2022-12-30 ENCOUNTER — HOSPITAL ENCOUNTER (OUTPATIENT)
Dept: PREADMISSION TESTING | Facility: HOSPITAL | Age: 67
Discharge: HOME | End: 2022-12-30
Attending: ORTHOPAEDIC SURGERY
Payer: COMMERCIAL

## 2022-12-30 ENCOUNTER — OFFICE VISIT (OUTPATIENT)
Dept: ORTHOPEDICS | Facility: CLINIC | Age: 67
End: 2022-12-30
Payer: COMMERCIAL

## 2022-12-30 ENCOUNTER — ANESTHESIA EVENT (OUTPATIENT)
Dept: SURGERY | Facility: HOSPITAL | Age: 67
End: 2022-12-30

## 2022-12-30 DIAGNOSIS — Z96.651 S/P TKR (TOTAL KNEE REPLACEMENT), RIGHT: Primary | ICD-10-CM

## 2022-12-30 DIAGNOSIS — M25.561 CHRONIC PAIN OF BOTH KNEES: ICD-10-CM

## 2022-12-30 DIAGNOSIS — M25.562 LEFT KNEE PAIN, UNSPECIFIED CHRONICITY: ICD-10-CM

## 2022-12-30 DIAGNOSIS — M17.0 PRIMARY OSTEOARTHRITIS OF BOTH KNEES: Primary | ICD-10-CM

## 2022-12-30 DIAGNOSIS — M25.562 LEFT KNEE PAIN, UNSPECIFIED CHRONICITY: Primary | ICD-10-CM

## 2022-12-30 DIAGNOSIS — M71.22 BAKER CYST, LEFT: ICD-10-CM

## 2022-12-30 DIAGNOSIS — G89.29 CHRONIC PAIN OF RIGHT KNEE: ICD-10-CM

## 2022-12-30 DIAGNOSIS — M25.561 CHRONIC PAIN OF RIGHT KNEE: ICD-10-CM

## 2022-12-30 DIAGNOSIS — M25.562 CHRONIC PAIN OF BOTH KNEES: ICD-10-CM

## 2022-12-30 DIAGNOSIS — M71.21 BAKER CYST, RIGHT: ICD-10-CM

## 2022-12-30 DIAGNOSIS — G89.29 CHRONIC PAIN OF BOTH KNEES: ICD-10-CM

## 2022-12-30 DIAGNOSIS — M17.11 PRIMARY OSTEOARTHRITIS OF RIGHT KNEE: Primary | ICD-10-CM

## 2022-12-30 PROCEDURE — 20610 DRAIN/INJ JOINT/BURSA W/O US: CPT | Mod: LT,S$GLB,, | Performed by: ORTHOPAEDIC SURGERY

## 2022-12-30 PROCEDURE — 99213 OFFICE O/P EST LOW 20 MIN: CPT | Mod: 25,S$GLB,, | Performed by: ORTHOPAEDIC SURGERY

## 2022-12-30 PROCEDURE — 73562 XR KNEE 3 VIEW LEFT: ICD-10-PCS | Mod: 26,LT,, | Performed by: RADIOLOGY

## 2022-12-30 PROCEDURE — 99900103 DSU ONLY-NO CHARGE-INITIAL HR (STAT)

## 2022-12-30 PROCEDURE — 99213 PR OFFICE/OUTPT VISIT, EST, LEVL III, 20-29 MIN: ICD-10-PCS | Mod: 25,S$GLB,, | Performed by: ORTHOPAEDIC SURGERY

## 2022-12-30 PROCEDURE — 99999 PR PBB SHADOW E&M-EST. PATIENT-LVL II: CPT | Mod: PBBFAC,,, | Performed by: ORTHOPAEDIC SURGERY

## 2022-12-30 PROCEDURE — 99999 PR PBB SHADOW E&M-EST. PATIENT-LVL II: ICD-10-PCS | Mod: PBBFAC,,, | Performed by: ORTHOPAEDIC SURGERY

## 2022-12-30 PROCEDURE — 20610 LARGE JOINT ASPIRATION/INJECTION: L KNEE: ICD-10-PCS | Mod: LT,S$GLB,, | Performed by: ORTHOPAEDIC SURGERY

## 2022-12-30 PROCEDURE — 73562 X-RAY EXAM OF KNEE 3: CPT | Mod: 26,LT,, | Performed by: RADIOLOGY

## 2022-12-30 PROCEDURE — 73562 X-RAY EXAM OF KNEE 3: CPT | Mod: TC,PN,LT

## 2022-12-30 RX ORDER — TRIAMCINOLONE ACETONIDE 40 MG/ML
40 INJECTION, SUSPENSION INTRA-ARTICULAR; INTRAMUSCULAR
Status: DISCONTINUED | OUTPATIENT
Start: 2022-12-30 | End: 2022-12-30 | Stop reason: HOSPADM

## 2022-12-30 RX ADMIN — TRIAMCINOLONE ACETONIDE 40 MG: 40 INJECTION, SUSPENSION INTRA-ARTICULAR; INTRAMUSCULAR at 03:12

## 2022-12-30 NOTE — H&P (VIEW-ONLY)
Chief Complaint: Pain of the Right Knee     HPI:  Ms. Lester is a 67-year-old lady who returned today with complaints of persistent pain in both of her knees.  Her right is worse than her left.  She has noticed increasing pain in her left knee and is scheduled for a total right knee arthroplasty.  She has had approximately 7 years of knee pain  She has a history of falling multiple times in the past.  Walking and driving increase her symptoms while sitting and rest decreases them.  She stated, I can hear it crunching .  She denies swelling but stated she gets giving way and occasional locking.  She has taken NSAIDs with help.  She has also worn a brace with help.  She has had steroid injections as well as viscosupplementation to her right knee which although gave her some relief is no longer helps her right knee, at some point she would like to trial viscosupplementation on her left knee to give her some relief while she is recovering from right total knee arthroplasty in the future..  Currently she can ambulate approximately 1 mi and climb approximately 2-3 stairs.  She does not use an ambulatory assistive device.             Past Medical History:   Diagnosis Date    Carpal tunnel syndrome bilateral     * Hyperlipidemia    Hypertension       *  *  *  *  * Osteoarthritis  Seasonal allergies  Depression  MS  Headaches  Psoriasis                  Past Surgical History:   Procedure Laterality Date    APPENDECTOMY         *  *  *  *  *  *  * TONSILLECTOMY  HEMORRHOIDECTOMY  TUBAL LIGATION  HYSTERECTOMY  COLONOSCOPY  CATARACT REMOVAL BOTH EYES  RECTAL TUCK  BLADDER TUCK             Review of patient's allergies indicates:  No Known Allergies     Social History              Occupational History    Nurse   Tobacco Use    Smoking status: Former Smoker   Substance and Sexual Activity    Alcohol use: Yes    Drug use: Never    Sexual activity: Not on file                Family History   Problem Relation Age of Onset     Alzheimer's disease Mother      COPD Father           Previous Hospitalizations:  Childbirth, appendicitis.     ROS:  No new diagnosis/surgery/prescriptions since last office visit on 11/09/2022   Constitutional: Negative for chills and fever.   HENT: Negative for congestion.    Eyes: Negative for blurred vision.   Cardiovascular: Negative for chest pain.   Respiratory: Negative for cough.    Endocrine: Negative for polydipsia.   Hematologic/Lymphatic: Negative for adenopathy.   Skin: Negative for flushing and itching.   Musculoskeletal: Positive for joint pain. Negative for gout.   Gastrointestinal: Negative for constipation, diarrhea and heartburn.   Genitourinary: Negative for nocturia.   Neurological: Positive for headaches. Negative for seizures.   Psychiatric/Behavioral: Positive for depression. Negative for substance abuse. The patient is not nervous/anxious.    Allergic/Immunologic: Positive for environmental allergies.             Objective:   Physical Exam:   General: AAOx3.  No acute distress  HEENT: Normocephalic, PEARLA EOMI.  Fair dentition  Neck: Supple, No JVD  Chest: Symetric, equal excursion on inspiration  Abdomen: Soft NTND  Vascular:  Pulses intact and equal bilaterally.  Capillary refill less than 3 seconds and equal bilaterally  Neurologic:  Pinprick and soft touch intact and equal bilaterally  Integment:  No ecchymosis, no errythema  Extremity:  Knee:  Extension/flexion equal bilaterally 2/118 degrees.  Baker cyst both knees.  Positive drop home both knees.  Valgus alignment both knees.  Mild effusion both knees.  Crepitus with motion both knees.  Mildly positive patellar load/compression both knees.  Negative patella apprehension/relocation both knees.  Mild increased excursion with valgus stressing both knees.  Varus stressing equal bilaterally with endpoint.  Lachman's/drawer equal bilaterally with endpoint.  Positive medial joint line tenderness both knee  Jessica negative both knees.   Evan positive both knees  Nontender at the anserine insertion bilaterally.  No swelling at the anserine insertion bilaterally.  Radiography:  Personally reviewed x-rays of the left knee completed on 12/30/2022 which showed moderate tricompartmental arthritic changes with early osteophytes.  Previous x-rays of the right knee completed on 11/09/2022 showed severe tricompartmental arthritic changes with bone-on-bone articulation and osteophytes.  Mild varus alignment.  Laboratory:  UA negative; WBC 4.9; hemoglobin 12.0; hematocrit 37.6; platelets 200; PT 10; INR 0.9; sodium 139; potassium 4.5; chloride 106; CO2 26; BUN 21; creatinine 1.0; glucose 89.      Assessment:       Impression:       1. Symptomatic tricompartmental arthritis, both knee   2.   3.  Baker cyst, both knee  Chronic bilateral knee pain          Plan:       1.  Discussed physical examination and radiographic findings with the patient. Lynne understands that she has advanced tricompartmental arthritis of both of her knees  Treatment alternatives and outcomes were discussed with the patient she understands she could be treated conservatively with observation, activity modification, NSAIDs, bracing, physical therapy, injections, or she could consider surgical intervention such as arthroscopy versus total knee arthroplasty.  At the point since the arthritis in her knees is so advanced it is unlikely that arthroscopy would help her and if she were to consider surgical intervention total knee arthroplasty would most likely be our best option.  She states she would like to proceed with surgery on her right knee.  She is already scheduled for a right total knee arthroplasty for 01/09/2022.  In the interim she would like to be considered for conservative management on her left knee and would like to trial viscosupplementation if possible.  She understands that viscosupplementation must be preauthorized before can be given.  .  2.  Possible complications  of surgery to include bleeding, infection, scarring, nerve/blood vessel/tendon damage, need for further surgery, failed surgery, failure to improve, possible persistent pain, possible arthrofibrosis, possible hardware failure, possible hardware breakage, possible leg length discrepancy, possible malrotation, and possible amputation were discussed with the patient.  The patient was permitted to ask questions and all concerns were addressed to her satisfaction.    3. Consent for right total knee arthroplasty.  4. Right total knee arthroplasty is currently scheduled for 01/09/2022.    5. Continue with NSAIDs as tolerated allowed by PCM.    6. Submit for for preauthorization for viscosupplementation to the left knee.  7. Continue with brace wear as previously shown discussed.  Once she has surgery on her right knee she can use her right knee brace on her left knee to help resolve some of her pain.  8. Continue with home exercises such as quadriceps and hamstring strengthening.    9. Offered a steroid injection to the left knee she elected to proceed this is the palliate her until viscosupplementation is preauthorized.  10. Percocet 7.5/325, 1 p.o. q.4-6 hours p.r.n. 0 a prescription was given to the patient have filled postop use.  She understands she is not use this until after surgery.    11. Xarelto 10 mg 1 p.o. q.day with food dispense 14, refill 0, a prescription was given to the patient have filled for postop use.  She understands this medication is not to be used until  after surgery.Follow up approximately 2 weeks postop

## 2022-12-30 NOTE — PRE ADMISSION SCREENING
Patient Name: Lynne Lester  YOB: 1955   MRN: [id number]     [facility]   Basic Mobility Inpatient Short Form 6 Clicks         How much difficulty does the patient currently have  Unable  A Lot  A Little  None      1. Turning over in bed (including adjusting bedclothes, sheets and blankets)?     1 []    2 []    3 []    4 [x]        2. Sitting down on and standing up from a chair with arms (e.g., wheelchair, bedside commode, etc.)     1 []  2 []  3 []     4 [x]      3. Moving from lying on back to sitting on the side of the bed?     1 []  2 []  3 []    4 [x]    How much help from another person does the patient currently need  Total  A Lot  A Little  None      4. Moving to and from a bed to a chair (including a wheelchair)?    1 []  2 []  3 []    4 [x]      5. Need to walk in hospital room?    1 []  2 []  3 []    4 [x]      6. Climbing 3-5 steps with a railing?    1 []  2 []  3 []    4 [x]       Raw Score:      24            CMS 0-100% Score:     0       %   Standardized Score:     61.14          CMS Modifier:       [facility] AM-PAC[facility]   Basic Mobility Inpatient Short Form 6 Clicks Score Conversion Table*         *Use this form to convert -PAC Basic Mobility Inpatient Raw Scores.   [facility] Inpatient Basic Mobility Short Form Scoring Example   1. Add the number values associated with the response to each item. For example, items totals yield a Raw Score of 21.   2. Match the raw score to the t-Scale scores (t-Scale score = 50.25, SE = 4.69).   3. Find the associated CMS % (CMS % = 28.97%).   4. Locate the correct CMS Functional Modifier Code, or G Code (G code = CJ)     NOTE: Each -PAC Short Form has a separate conversion table. Make sure that you use the correct conversion table.       Instruction Manual - page 45 contains conversion table

## 2022-12-30 NOTE — PROCEDURES
Large Joint Aspiration/Injection: L knee    Date/Time: 12/30/2022 3:30 PM  Performed by: Ish Alford DO  Authorized by: Ish Alford, DO     Consent Done?:  Yes (Verbal)  Indications:  Arthritis, diagnostic evaluation, joint swelling and pain  Site marked: the procedure site was marked    Timeout: prior to procedure the correct patient, procedure, and site was verified    Prep: patient was prepped and draped in usual sterile fashion      Details:  Needle Size:  22 G  Ultrasonic Guidance for needle placement?: No    Approach:  Anterolateral  Location:  Knee  Site:  L knee  Medications:  40 mg triamcinolone acetonide 40 mg/mL  Patient tolerance:  Patient tolerated the procedure well with no immediate complications

## 2022-12-30 NOTE — ANESTHESIA PREPROCEDURE EVALUATION
12/30/2022  Lynne Lester is a 67 y.o., female.      Pre-op Assessment    I have reviewed the Patient Summary Reports.     I have reviewed the Nursing Notes. I have reviewed the NPO Status.   I have reviewed the Medications.     Review of Systems  Social:  Former Smoker    Hematology/Oncology:  Hematology Normal   Oncology Normal     EENT/Dental:EENT/Dental Normal   Cardiovascular:   Hypertension hyperlipidemia    Pulmonary:  Pulmonary Normal    Renal/:  Renal/ Normal     Hepatic/GI:  Hepatic/GI Normal    Musculoskeletal:   Arthritis     Neurological:   Headaches    Endocrine:   Hypothyroidism        Physical Exam    Airway:  Mallampati: II   Mouth Opening: Normal  TM Distance: Normal  Tongue: Normal  Neck ROM: Normal ROM    Chest/Lungs:  Clear to auscultation    Heart:  Rate: Normal        Anesthesia Plan  Type of Anesthesia, risks & benefits discussed:    Anesthesia Type: Gen ETT  Intra-op Monitoring Plan: Standard ASA Monitors  Post Op Pain Control Plan: multimodal analgesia and peripheral nerve block  Airway Plan: Direct  Informed Consent: Informed consent signed with the Patient and all parties understand the risks and agree with anesthesia plan.  All questions answered.   ASA Score: 2  Day of Surgery Review of History & Physical: H&P Update referred to the surgeon/provider.    Ready For Surgery From Anesthesia Perspective.     .

## 2022-12-30 NOTE — TELEPHONE ENCOUNTER
Returned call. I stated the physical therapy order will be faxed over today. Leora stated understanding and that the patient was just try to set up therapy for after her total joint.    ----- Message from Shane Rutledge sent at 12/30/2022 10:14 AM CST -----  Regarding: OP,PT needs orders, fax to [facility] fax  , [place]   Contact: Leora   OP, PT needs orders, fax to [facility] fax  , [place]

## 2022-12-30 NOTE — PRE ADMISSION SCREENING
"               CJR Risk Assessment Scale    Patient Name: Lynne Lester  YOB: 1955  MRN: [id number]            RIsk Factor Measure Recommendation Patient Data Scale/Score   BMI >40 Reconsider surgery, weight loss   Estimated body mass index is 29.63 kg/m² as calculated from the following:    Height as of 11/9/22: 5' 7" (1.702 m).    Weight as of 11/9/22: 85.8 kg (189 lb 2.5 oz).   [] 0 = 1 - 24.9  [x] 1 = 25-29.9  [] 2 = 30-34.9  [] 3 = 35-39.9  [] 4 = 40-44.9  [] 5 = 45-99.9   Hemoglobin AIC (if applicable) >9 Delay surgery until DM under control  Refer for:  Nutrition Therapy  Exercise   Medication    Lab Results   Component Value Date    HGBA1C 5.0 05/19/2022       Lab Results   Component Value Date    GLU 85 12/30/2022      [x] 0 = 4.0-5.6  [] 1 = 5.7-6.4  [] 2 = 6.5-6.9  [] 3 = 7.0-7.9  [] 4 = 8.0-8.9  [] 5 = 9.0-12   Hemoglobin (Anemia) <9 Delay surgery   Correct anemia Lab Results   Component Value Date    HGB 12.0 12/30/2022    [] 20 - <9.0                    Albumin <3 Delay surgery &Workup Lab Results   Component Value Date    ALBUMIN 3.5 09/02/2022    [] 20 - <3.0   Smoking Cessation >4 Weeks Delay Surgery  Refer to OP Cessation Class    Former Smoker [] 20 - current smoker                                _____ PPD                    Hx of MI, PE, Arrhythmia, CVA, DVT <30 Days Delay Surgery    N/A [] 20      Infection Variable Delay surgery and re-evaluate   N/A [] 20 - recent/current infection     Depression (PHQ) >10 out of 27 Delay Surgery and re-evaluate  Medication  Counseling              [x] 0     []1     []2     []3      []4      [] 5                    (1-4)      (5-9)  (10-14)  (15-19)   (20-27)     Memory Impairment & Memory loss (Mini-Cog Screening Tool) Advanced dementia and/or Parkinson's Reconsider surgery     [x] 0     []1     []2     []3     []4     [] 5     Physical Conditioning (Modified AM-PAC Per Physical Therapy at [facility]) Unable to ambulate on day of surgery " Delay surgery and re-evaluate  Pre-Rehabilitation   (PT evaluation)       [x]  0   []4       []8     []12        []16     []20       (<20%)   (<40%)   (<60%)   (<80% )    (>80%)     Home Environment/Caregiver support  (Per /Navigator Interview)    Availability of basic services and/or approprate assistance during post-operative period Delay surgery and re-evaluate  Safe home environment  Health   1 week post-surgery  Transportation  availability  Ability to obtain DME/Medications post-op    [x] 0     []1     []2     []3     []4     [] 5  [x] 0     []1     []2     []3     []4     [] 5  [x] 0     []1     []2     []3     []4     [] 5  [x] 0     []1     []2     []3     []4     [] 5         MD Contact: Dr. Alford Comments:  Total Score:  1

## 2022-12-30 NOTE — PROGRESS NOTES
Chief Complaint: Pain of the Right Knee     HPI:  Ms. Lester is a 67-year-old lady who returned today with complaints of persistent pain in both of her knees.  Her right is worse than her left.  She has noticed increasing pain in her left knee and is scheduled for a total right knee arthroplasty.  She has had approximately 7 years of knee pain  She has a history of falling multiple times in the past.  Walking and driving increase her symptoms while sitting and rest decreases them.  She stated, I can hear it crunching .  She denies swelling but stated she gets giving way and occasional locking.  She has taken NSAIDs with help.  She has also worn a brace with help.  She has had steroid injections as well as viscosupplementation to her right knee which although gave her some relief is no longer helps her right knee, at some point she would like to trial viscosupplementation on her left knee to give her some relief while she is recovering from right total knee arthroplasty in the future..  Currently she can ambulate approximately 1 mi and climb approximately 2-3 stairs.  She does not use an ambulatory assistive device.             Past Medical History:   Diagnosis Date    Carpal tunnel syndrome bilateral     * Hyperlipidemia    Hypertension       *  *  *  *  * Osteoarthritis  Seasonal allergies  Depression  MS  Headaches  Psoriasis                  Past Surgical History:   Procedure Laterality Date    APPENDECTOMY         *  *  *  *  *  *  * TONSILLECTOMY  HEMORRHOIDECTOMY  TUBAL LIGATION  HYSTERECTOMY  COLONOSCOPY  CATARACT REMOVAL BOTH EYES  RECTAL TUCK  BLADDER TUCK             Review of patient's allergies indicates:  No Known Allergies     Social History              Occupational History    Nurse   Tobacco Use    Smoking status: Former Smoker   Substance and Sexual Activity    Alcohol use: Yes    Drug use: Never    Sexual activity: Not on file                Family History   Problem Relation Age of Onset     Alzheimer's disease Mother      COPD Father           Previous Hospitalizations:  Childbirth, appendicitis.     ROS:  No new diagnosis/surgery/prescriptions since last office visit on 11/09/2022   Constitutional: Negative for chills and fever.   HENT: Negative for congestion.    Eyes: Negative for blurred vision.   Cardiovascular: Negative for chest pain.   Respiratory: Negative for cough.    Endocrine: Negative for polydipsia.   Hematologic/Lymphatic: Negative for adenopathy.   Skin: Negative for flushing and itching.   Musculoskeletal: Positive for joint pain. Negative for gout.   Gastrointestinal: Negative for constipation, diarrhea and heartburn.   Genitourinary: Negative for nocturia.   Neurological: Positive for headaches. Negative for seizures.   Psychiatric/Behavioral: Positive for depression. Negative for substance abuse. The patient is not nervous/anxious.    Allergic/Immunologic: Positive for environmental allergies.             Objective:   Physical Exam:   General: AAOx3.  No acute distress  HEENT: Normocephalic, PEARLA EOMI.  Fair dentition  Neck: Supple, No JVD  Chest: Symetric, equal excursion on inspiration  Abdomen: Soft NTND  Vascular:  Pulses intact and equal bilaterally.  Capillary refill less than 3 seconds and equal bilaterally  Neurologic:  Pinprick and soft touch intact and equal bilaterally  Integment:  No ecchymosis, no errythema  Extremity:  Knee:  Extension/flexion equal bilaterally 2/118 degrees.  Baker cyst both knees.  Positive drop home both knees.  Valgus alignment both knees.  Mild effusion both knees.  Crepitus with motion both knees.  Mildly positive patellar load/compression both knees.  Negative patella apprehension/relocation both knees.  Mild increased excursion with valgus stressing both knees.  Varus stressing equal bilaterally with endpoint.  Lachman's/drawer equal bilaterally with endpoint.  Positive medial joint line tenderness both knee  Jessica negative both knees.   Evan positive both knees  Nontender at the anserine insertion bilaterally.  No swelling at the anserine insertion bilaterally.  Radiography:  Personally reviewed x-rays of the left knee completed on 12/30/2022 which showed moderate tricompartmental arthritic changes with early osteophytes.  Previous x-rays of the right knee completed on 11/09/2022 showed severe tricompartmental arthritic changes with bone-on-bone articulation and osteophytes.  Mild varus alignment.  Laboratory:  UA negative; WBC 4.9; hemoglobin 12.0; hematocrit 37.6; platelets 200; PT 10; INR 0.9; sodium 139; potassium 4.5; chloride 106; CO2 26; BUN 21; creatinine 1.0; glucose 89.      Assessment:       Impression:       1. Symptomatic tricompartmental arthritis, both knee   2.   3.  Baker cyst, both knee  Chronic bilateral knee pain          Plan:       1.  Discussed physical examination and radiographic findings with the patient. Lynne understands that she has advanced tricompartmental arthritis of both of her knees  Treatment alternatives and outcomes were discussed with the patient she understands she could be treated conservatively with observation, activity modification, NSAIDs, bracing, physical therapy, injections, or she could consider surgical intervention such as arthroscopy versus total knee arthroplasty.  At the point since the arthritis in her knees is so advanced it is unlikely that arthroscopy would help her and if she were to consider surgical intervention total knee arthroplasty would most likely be our best option.  She states she would like to proceed with surgery on her right knee.  She is already scheduled for a right total knee arthroplasty for 01/09/2022.  In the interim she would like to be considered for conservative management on her left knee and would like to trial viscosupplementation if possible.  She understands that viscosupplementation must be preauthorized before can be given.  .  2.  Possible complications  of surgery to include bleeding, infection, scarring, nerve/blood vessel/tendon damage, need for further surgery, failed surgery, failure to improve, possible persistent pain, possible arthrofibrosis, possible hardware failure, possible hardware breakage, possible leg length discrepancy, possible malrotation, and possible amputation were discussed with the patient.  The patient was permitted to ask questions and all concerns were addressed to her satisfaction.    3. Consent for right total knee arthroplasty.  4. Right total knee arthroplasty is currently scheduled for 01/09/2022.    5. Continue with NSAIDs as tolerated allowed by PCM.    6. Submit for for preauthorization for viscosupplementation to the left knee.  7. Continue with brace wear as previously shown discussed.  Once she has surgery on her right knee she can use her right knee brace on her left knee to help resolve some of her pain.  8. Continue with home exercises such as quadriceps and hamstring strengthening.    9. Offered a steroid injection to the left knee she elected to proceed this is the palliate her until viscosupplementation is preauthorized.  10. Percocet 7.5/325, 1 p.o. q.4-6 hours p.r.n. 0 a prescription was given to the patient have filled postop use.  She understands she is not use this until after surgery.    11. Xarelto 10 mg 1 p.o. q.day with food dispense 14, refill 0, a prescription was given to the patient have filled for postop use.  She understands this medication is not to be used until  after surgery.  12. Follow up approximately 2 weeks postop

## 2022-12-30 NOTE — PRE ADMISSION SCREENING
JOINT CAMP ASSESSMENT    Name Lynne Lester   MRN [id number]    Age/Sex 67 y.o. female    Surgeon Dr. Ish Alford   Joint Camp Date 12/30/2022   Surgery Date 01/09/2023   Procedure Right Knee Arthroplasty   Insurance Payor: [employer] / Plan: RxCost Containment / Product Type: Medicare Advantage /    Care Team Patient Care Team:  Tyler Moreno MD as PCP - General (Family Medicine)  Ish Alford DO as Consulting Physician (Orthopedic Surgery)    Pharmacy   [id number] - [place], [place] - [address]  [address]  [place] [address]  Phone: [phone] Fax: [phone]    [facility] - [place], [place] - [address]  [address] [address]  Phone: [phone] Fax: [phone]     AM-PAC Score   24   Risk Assessment Score 1     Past Medical History:   Diagnosis Date    Carpal tunnel syndrome     Bilateral    Hypertension     Hypothyroidism 2/17/2022    Osteoarthritis        Past Surgical History:   Procedure Laterality Date    APPENDECTOMY      BARIATRIC SURGERY      BLADDER REPAIR      CATARACT EXTRACTION, BILATERAL      COLONOSCOPY N/A 09/08/2022    Procedure: COLONOSCOPY;  Surgeon: Carine Dc MD;  Location: [facility];  Service: General;  Laterality: N/A;    HYSTERECTOMY      OOPHORECTOMY      TONSILLECTOMY           Home Enviroment     Living Arrangement: Lives with family  Home Environment: 1-story house/ trailer, walk-in shower  Home Safety Concerns: unremarkable    DISCHARGE CAREGIVER/SUPPORT SYSTEM     Identified post-op caregiver: Patient has children / family / friends to help, spouse and son.  Patient's caregiver(s) will be able to provide physical assistance. Patient will have someone to assist overnight.      Caregiver present at pre-op interview:  No      PRE-OPERATIVE FUNCTIONAL STATUS     Employment: Retired    Pre-op Functional Status: Patient is independent with  mobility/ambulation, transfers, ADL's, IADL's.    Use of assistive device for ambulation: none  ADL: self care  ADL Limitations: difficulty with walking  Medical Restrictions: Unstable ambulation and Decreased range of motions in extremities    POTENTIAL BARRIERS TO DISCHARGE/POTENTIAL POST-OP COMPLICATIONS     Patient with hx of HTN.    DISCHARGE PLAN     Expected LOS of 1 days or less for joint replacement discussed with patient. We also discussed a discharge path of HH for approximately two weeks with a transition to outpatient PT on the third week given no post-op complications.      Patient in agreement with discharge plan: Yes    Discharge to: Discharge home with home health (PT/OT) x2 weeks with transition to outpatient PT     HH:  [facility] ([place], [place]).  Patient disclosure form completed and sent to case management for upload to the medical record.      OP PT: [facility] ([facility], [place]).     Home DME: none    Needed DME at D/C: rolling walker and bedside commode     Rx: Per Dr. Alford at discharge     Meds to Beds: Yes  Patient expected to discharge on Xarelto (Rivaroxaban) for DVT prophylaxis.

## 2023-01-04 NOTE — PLAN OF CARE
Discharge assessment done via [facility]. Patient told her she now has [employer] insurance. Called patient to get her information. Sent message to Kaylee with Dr Alford's office know that she now has [employer] & provided her with the ID #. She is unable to enter in new insurance as registration usually does that. Attempt to call registration but no answer. Spoke to Kassie with insurance verification & she will correct the insurance & contact pre-service so they can obtain auth with [employer].

## 2023-01-08 ENCOUNTER — LAB VISIT (OUTPATIENT)
Dept: LAB | Facility: HOSPITAL | Age: 68
End: 2023-01-08
Attending: ORTHOPAEDIC SURGERY
Payer: MEDICARE

## 2023-01-08 DIAGNOSIS — Z01.818 PRE-OP TESTING: ICD-10-CM

## 2023-01-08 LAB
ABO + RH BLD: NORMAL
BLD GP AB SCN CELLS X3 SERPL QL: NORMAL

## 2023-01-08 PROCEDURE — 36415 COLL VENOUS BLD VENIPUNCTURE: CPT | Performed by: ORTHOPAEDIC SURGERY

## 2023-01-08 PROCEDURE — 86900 BLOOD TYPING SEROLOGIC ABO: CPT | Performed by: ORTHOPAEDIC SURGERY

## 2023-01-09 ENCOUNTER — HOSPITAL ENCOUNTER (OUTPATIENT)
Dept: RADIOLOGY | Facility: HOSPITAL | Age: 68
Discharge: HOME | End: 2023-01-09
Attending: ORTHOPAEDIC SURGERY
Payer: MEDICARE

## 2023-01-09 ENCOUNTER — ANESTHESIA (OUTPATIENT)
Dept: SURGERY | Facility: HOSPITAL | Age: 68
End: 2023-01-09
Payer: MEDICARE

## 2023-01-09 ENCOUNTER — HOSPITAL ENCOUNTER (OUTPATIENT)
Facility: HOSPITAL | Age: 68
Discharge: HOME | End: 2023-01-10
Attending: ORTHOPAEDIC SURGERY | Admitting: ORTHOPAEDIC SURGERY
Payer: MEDICARE

## 2023-01-09 DIAGNOSIS — Z96.651 S/P TOTAL KNEE ARTHROPLASTY, RIGHT: Primary | ICD-10-CM

## 2023-01-09 PROBLEM — M17.0 PRIMARY OSTEOARTHRITIS OF BOTH KNEES: Status: RESOLVED | Noted: 2023-01-09 | Resolved: 2023-01-09

## 2023-01-09 PROBLEM — M17.0 PRIMARY OSTEOARTHRITIS OF BOTH KNEES: Status: ACTIVE | Noted: 2023-01-09

## 2023-01-09 LAB
HCT VFR BLD AUTO: 36.1 % (ref 37–48.5)
HGB BLD-MCNC: 11.7 G/DL (ref 12–16)

## 2023-01-09 PROCEDURE — 25000003 PHARM REV CODE 250: Performed by: ORTHOPAEDIC SURGERY

## 2023-01-09 PROCEDURE — 85018 HEMOGLOBIN: CPT | Performed by: ORTHOPAEDIC SURGERY

## 2023-01-09 PROCEDURE — D9220A PRA ANESTHESIA: Mod: CRNA,,, | Performed by: NURSE ANESTHETIST, CERTIFIED REGISTERED

## 2023-01-09 PROCEDURE — 36415 COLL VENOUS BLD VENIPUNCTURE: CPT | Performed by: ORTHOPAEDIC SURGERY

## 2023-01-09 PROCEDURE — D9220A PRA ANESTHESIA: ICD-10-PCS | Mod: ANES,,, | Performed by: ANESTHESIOLOGY

## 2023-01-09 PROCEDURE — 71000033 HC RECOVERY, INTIAL HOUR: Performed by: ORTHOPAEDIC SURGERY

## 2023-01-09 PROCEDURE — 63600175 PHARM REV CODE 636 W HCPCS: Performed by: ORTHOPAEDIC SURGERY

## 2023-01-09 PROCEDURE — 25000003 PHARM REV CODE 250: Performed by: STUDENT IN AN ORGANIZED HEALTH CARE EDUCATION/TRAINING PROGRAM

## 2023-01-09 PROCEDURE — 63600175 PHARM REV CODE 636 W HCPCS: Performed by: ANESTHESIOLOGY

## 2023-01-09 PROCEDURE — D9220A PRA ANESTHESIA: ICD-10-PCS | Mod: CRNA,,, | Performed by: NURSE ANESTHETIST, CERTIFIED REGISTERED

## 2023-01-09 PROCEDURE — 36000710: Performed by: ORTHOPAEDIC SURGERY

## 2023-01-09 PROCEDURE — 64447 PERIPHERAL BLOCK: ICD-10-PCS | Mod: 59,RT,, | Performed by: ANESTHESIOLOGY

## 2023-01-09 PROCEDURE — D9220A PRA ANESTHESIA: Mod: ANES,,, | Performed by: ANESTHESIOLOGY

## 2023-01-09 PROCEDURE — 27201423 OPTIME MED/SURG SUP & DEVICES STERILE SUPPLY: Performed by: ORTHOPAEDIC SURGERY

## 2023-01-09 PROCEDURE — C1713 ANCHOR/SCREW BN/BN,TIS/BN: HCPCS | Performed by: ORTHOPAEDIC SURGERY

## 2023-01-09 PROCEDURE — 73560 XR KNEE 1 OR 2 VIEW RIGHT: ICD-10-PCS | Mod: 26,RT,, | Performed by: RADIOLOGY

## 2023-01-09 PROCEDURE — 25000003 PHARM REV CODE 250: Performed by: NURSE ANESTHETIST, CERTIFIED REGISTERED

## 2023-01-09 PROCEDURE — 64447 NJX AA&/STRD FEMORAL NRV IMG: CPT | Mod: RT | Performed by: ANESTHESIOLOGY

## 2023-01-09 PROCEDURE — C1776 JOINT DEVICE (IMPLANTABLE): HCPCS | Performed by: ORTHOPAEDIC SURGERY

## 2023-01-09 PROCEDURE — 27447 PR TOTAL KNEE ARTHROPLASTY: ICD-10-PCS | Mod: RT,,, | Performed by: ORTHOPAEDIC SURGERY

## 2023-01-09 PROCEDURE — 36000711: Performed by: ORTHOPAEDIC SURGERY

## 2023-01-09 PROCEDURE — 85014 HEMATOCRIT: CPT | Performed by: ORTHOPAEDIC SURGERY

## 2023-01-09 PROCEDURE — 27447 TOTAL KNEE ARTHROPLASTY: CPT | Mod: RT,,, | Performed by: ORTHOPAEDIC SURGERY

## 2023-01-09 PROCEDURE — 63600175 PHARM REV CODE 636 W HCPCS: Performed by: NURSE ANESTHETIST, CERTIFIED REGISTERED

## 2023-01-09 PROCEDURE — C1889 IMPLANT/INSERT DEVICE, NOC: HCPCS | Performed by: ORTHOPAEDIC SURGERY

## 2023-01-09 PROCEDURE — 73560 X-RAY EXAM OF KNEE 1 OR 2: CPT | Mod: TC,RT

## 2023-01-09 PROCEDURE — 73560 X-RAY EXAM OF KNEE 1 OR 2: CPT | Mod: 26,RT,, | Performed by: RADIOLOGY

## 2023-01-09 PROCEDURE — 63600175 PHARM REV CODE 636 W HCPCS: Performed by: STUDENT IN AN ORGANIZED HEALTH CARE EDUCATION/TRAINING PROGRAM

## 2023-01-09 PROCEDURE — 37000008 HC ANESTHESIA 1ST 15 MINUTES: Performed by: ORTHOPAEDIC SURGERY

## 2023-01-09 PROCEDURE — 37000009 HC ANESTHESIA EA ADD 15 MINS: Performed by: ORTHOPAEDIC SURGERY

## 2023-01-09 DEVICE — PATELLA OVAL DOME 35MM: Type: IMPLANTABLE DEVICE | Site: KNEE | Status: FUNCTIONAL

## 2023-01-09 DEVICE — INSERT TIBIAL PFC SZ 3 10MM: Type: IMPLANTABLE DEVICE | Site: KNEE | Status: FUNCTIONAL

## 2023-01-09 DEVICE — TRAY TIBIAL CEMENT SZ 3 COBAL: Type: IMPLANTABLE DEVICE | Site: KNEE | Status: FUNCTIONAL

## 2023-01-09 DEVICE — CEMENT BONE SMPLX HV GENTMYCN: Type: IMPLANTABLE DEVICE | Site: KNEE | Status: FUNCTIONAL

## 2023-01-09 DEVICE — IMPLANTABLE DEVICE: Type: IMPLANTABLE DEVICE | Site: KNEE | Status: FUNCTIONAL

## 2023-01-09 DEVICE — KIT PIN STEINMANN PFC .025X5IN: Type: IMPLANTABLE DEVICE | Site: KNEE | Status: FUNCTIONAL

## 2023-01-09 RX ORDER — CEFAZOLIN SODIUM 1 G/50ML
1 SOLUTION INTRAVENOUS
Status: DISCONTINUED | OUTPATIENT
Start: 2023-01-09 | End: 2023-01-10 | Stop reason: HOSPADM

## 2023-01-09 RX ORDER — MUPIROCIN 20 MG/G
OINTMENT TOPICAL ONCE
Status: COMPLETED | OUTPATIENT
Start: 2023-01-09 | End: 2023-01-09

## 2023-01-09 RX ORDER — ONDANSETRON 2 MG/ML
4 INJECTION INTRAMUSCULAR; INTRAVENOUS EVERY 8 HOURS PRN
Status: DISCONTINUED | OUTPATIENT
Start: 2023-01-09 | End: 2023-01-10 | Stop reason: HOSPADM

## 2023-01-09 RX ORDER — MORPHINE SULFATE 4 MG/ML
2 INJECTION, SOLUTION INTRAMUSCULAR; INTRAVENOUS EVERY 5 MIN PRN
Status: DISCONTINUED | OUTPATIENT
Start: 2023-01-09 | End: 2023-01-09 | Stop reason: HOSPADM

## 2023-01-09 RX ORDER — LIDOCAINE HYDROCHLORIDE 20 MG/ML
INJECTION, SOLUTION EPIDURAL; INFILTRATION; INTRACAUDAL; PERINEURAL
Status: DISCONTINUED | OUTPATIENT
Start: 2023-01-09 | End: 2023-01-09

## 2023-01-09 RX ORDER — SODIUM CHLORIDE, SODIUM LACTATE, POTASSIUM CHLORIDE, CALCIUM CHLORIDE 600; 310; 30; 20 MG/100ML; MG/100ML; MG/100ML; MG/100ML
125 INJECTION, SOLUTION INTRAVENOUS CONTINUOUS
Status: DISCONTINUED | OUTPATIENT
Start: 2023-01-09 | End: 2023-01-09

## 2023-01-09 RX ORDER — ONDANSETRON 2 MG/ML
INJECTION INTRAMUSCULAR; INTRAVENOUS
Status: DISCONTINUED | OUTPATIENT
Start: 2023-01-09 | End: 2023-01-09

## 2023-01-09 RX ORDER — FLUOXETINE 10 MG/1
20 CAPSULE ORAL DAILY
Status: DISCONTINUED | OUTPATIENT
Start: 2023-01-10 | End: 2023-01-10 | Stop reason: HOSPADM

## 2023-01-09 RX ORDER — MIDAZOLAM HYDROCHLORIDE 1 MG/ML
INJECTION INTRAMUSCULAR; INTRAVENOUS
Status: DISCONTINUED | OUTPATIENT
Start: 2023-01-09 | End: 2023-01-09

## 2023-01-09 RX ORDER — CEFAZOLIN SODIUM 1 G/3ML
1 INJECTION, POWDER, FOR SOLUTION INTRAMUSCULAR; INTRAVENOUS
Status: DISCONTINUED | OUTPATIENT
Start: 2023-01-09 | End: 2023-01-09

## 2023-01-09 RX ORDER — LIDOCAINE HYDROCHLORIDE 10 MG/ML
1 INJECTION, SOLUTION EPIDURAL; INFILTRATION; INTRACAUDAL; PERINEURAL ONCE
Status: DISCONTINUED | OUTPATIENT
Start: 2023-01-09 | End: 2023-01-09 | Stop reason: HOSPADM

## 2023-01-09 RX ORDER — HYDROMORPHONE HYDROCHLORIDE 2 MG/ML
INJECTION, SOLUTION INTRAMUSCULAR; INTRAVENOUS; SUBCUTANEOUS
Status: DISCONTINUED | OUTPATIENT
Start: 2023-01-09 | End: 2023-01-09

## 2023-01-09 RX ORDER — MORPHINE SULFATE 4 MG/ML
4 INJECTION, SOLUTION INTRAMUSCULAR; INTRAVENOUS EVERY 4 HOURS PRN
Status: DISCONTINUED | OUTPATIENT
Start: 2023-01-09 | End: 2023-01-10 | Stop reason: HOSPADM

## 2023-01-09 RX ORDER — MEPERIDINE HYDROCHLORIDE 50 MG/ML
70 INJECTION INTRAMUSCULAR; INTRAVENOUS; SUBCUTANEOUS
Status: DISCONTINUED | OUTPATIENT
Start: 2023-01-09 | End: 2023-01-09

## 2023-01-09 RX ORDER — ROPIVACAINE HYDROCHLORIDE 5 MG/ML
INJECTION, SOLUTION EPIDURAL; INFILTRATION; PERINEURAL
Status: COMPLETED | OUTPATIENT
Start: 2023-01-09 | End: 2023-01-09

## 2023-01-09 RX ORDER — ACETAMINOPHEN 10 MG/ML
INJECTION, SOLUTION INTRAVENOUS
Status: DISCONTINUED | OUTPATIENT
Start: 2023-01-09 | End: 2023-01-09

## 2023-01-09 RX ORDER — ACETAMINOPHEN 325 MG/1
650 TABLET ORAL EVERY 4 HOURS PRN
Status: DISCONTINUED | OUTPATIENT
Start: 2023-01-09 | End: 2023-01-10 | Stop reason: HOSPADM

## 2023-01-09 RX ORDER — ROCURONIUM BROMIDE 10 MG/ML
INJECTION, SOLUTION INTRAVENOUS
Status: DISCONTINUED | OUTPATIENT
Start: 2023-01-09 | End: 2023-01-09

## 2023-01-09 RX ORDER — FENTANYL CITRATE 50 UG/ML
INJECTION, SOLUTION INTRAMUSCULAR; INTRAVENOUS
Status: DISCONTINUED | OUTPATIENT
Start: 2023-01-09 | End: 2023-01-09

## 2023-01-09 RX ORDER — CEFAZOLIN SODIUM 2 G/50ML
2 SOLUTION INTRAVENOUS ONCE
Status: COMPLETED | OUTPATIENT
Start: 2023-01-09 | End: 2023-01-09

## 2023-01-09 RX ORDER — PROPRANOLOL HYDROCHLORIDE 10 MG/1
30 TABLET ORAL 2 TIMES DAILY
Status: DISCONTINUED | OUTPATIENT
Start: 2023-01-09 | End: 2023-01-09

## 2023-01-09 RX ORDER — DEXAMETHASONE SODIUM PHOSPHATE 4 MG/ML
INJECTION, SOLUTION INTRA-ARTICULAR; INTRALESIONAL; INTRAMUSCULAR; INTRAVENOUS; SOFT TISSUE
Status: DISCONTINUED | OUTPATIENT
Start: 2023-01-09 | End: 2023-01-09

## 2023-01-09 RX ORDER — TRANEXAMIC ACID 100 MG/ML
INJECTION, SOLUTION INTRAVENOUS
Status: DISCONTINUED | OUTPATIENT
Start: 2023-01-09 | End: 2023-01-09

## 2023-01-09 RX ORDER — LEVOTHYROXINE SODIUM 88 UG/1
88 TABLET ORAL
Status: DISCONTINUED | OUTPATIENT
Start: 2023-01-10 | End: 2023-01-10 | Stop reason: HOSPADM

## 2023-01-09 RX ORDER — ATORVASTATIN CALCIUM 40 MG/1
40 TABLET, FILM COATED ORAL NIGHTLY
Status: DISCONTINUED | OUTPATIENT
Start: 2023-01-09 | End: 2023-01-10 | Stop reason: HOSPADM

## 2023-01-09 RX ORDER — PROPRANOLOL HYDROCHLORIDE 10 MG/1
30 TABLET ORAL DAILY
Status: DISCONTINUED | OUTPATIENT
Start: 2023-01-10 | End: 2023-01-10 | Stop reason: HOSPADM

## 2023-01-09 RX ORDER — PROPOFOL 10 MG/ML
VIAL (ML) INTRAVENOUS
Status: DISCONTINUED | OUTPATIENT
Start: 2023-01-09 | End: 2023-01-09

## 2023-01-09 RX ORDER — ONDANSETRON 2 MG/ML
4 INJECTION INTRAMUSCULAR; INTRAVENOUS DAILY PRN
Status: DISCONTINUED | OUTPATIENT
Start: 2023-01-09 | End: 2023-01-09 | Stop reason: HOSPADM

## 2023-01-09 RX ORDER — SUMATRIPTAN SUCCINATE 25 MG/1
100 TABLET ORAL
Status: DISCONTINUED | OUTPATIENT
Start: 2023-01-09 | End: 2023-01-09

## 2023-01-09 RX ORDER — DIPHENHYDRAMINE HYDROCHLORIDE 50 MG/ML
12.5 INJECTION INTRAMUSCULAR; INTRAVENOUS
Status: DISCONTINUED | OUTPATIENT
Start: 2023-01-09 | End: 2023-01-09 | Stop reason: HOSPADM

## 2023-01-09 RX ORDER — HYDROCODONE BITARTRATE AND ACETAMINOPHEN 10; 325 MG/1; MG/1
1 TABLET ORAL EVERY 6 HOURS PRN
Status: DISCONTINUED | OUTPATIENT
Start: 2023-01-09 | End: 2023-01-10 | Stop reason: HOSPADM

## 2023-01-09 RX ORDER — SODIUM CHLORIDE, SODIUM LACTATE, POTASSIUM CHLORIDE, CALCIUM CHLORIDE 600; 310; 30; 20 MG/100ML; MG/100ML; MG/100ML; MG/100ML
INJECTION, SOLUTION INTRAVENOUS CONTINUOUS
Status: DISCONTINUED | OUTPATIENT
Start: 2023-01-09 | End: 2023-01-09

## 2023-01-09 RX ORDER — MIDAZOLAM HYDROCHLORIDE 1 MG/ML
2 INJECTION INTRAMUSCULAR; INTRAVENOUS ONCE
Status: COMPLETED | OUTPATIENT
Start: 2023-01-09 | End: 2023-01-09

## 2023-01-09 RX ORDER — KETOROLAC TROMETHAMINE 30 MG/ML
15 INJECTION, SOLUTION INTRAMUSCULAR; INTRAVENOUS ONCE
Status: DISCONTINUED | OUTPATIENT
Start: 2023-01-09 | End: 2023-01-09 | Stop reason: HOSPADM

## 2023-01-09 RX ADMIN — ROCURONIUM BROMIDE 30 MG: 10 INJECTION, SOLUTION INTRAVENOUS at 01:01

## 2023-01-09 RX ADMIN — RIVAROXABAN 10 MG: 10 TABLET, FILM COATED ORAL at 05:01

## 2023-01-09 RX ADMIN — MIDAZOLAM HYDROCHLORIDE 2 MG: 1 INJECTION, SOLUTION INTRAMUSCULAR; INTRAVENOUS at 11:01

## 2023-01-09 RX ADMIN — SODIUM CHLORIDE, POTASSIUM CHLORIDE, SODIUM LACTATE AND CALCIUM CHLORIDE: 600; 310; 30; 20 INJECTION, SOLUTION INTRAVENOUS at 02:01

## 2023-01-09 RX ADMIN — CEFAZOLIN SODIUM 1 G: 1 SOLUTION INTRAVENOUS at 09:01

## 2023-01-09 RX ADMIN — MIDAZOLAM HYDROCHLORIDE 5 MG: 1 INJECTION, SOLUTION INTRAMUSCULAR; INTRAVENOUS at 01:01

## 2023-01-09 RX ADMIN — ROPIVACAINE HYDROCHLORIDE 20 ML: 5 INJECTION, SOLUTION EPIDURAL; INFILTRATION; PERINEURAL at 11:01

## 2023-01-09 RX ADMIN — PROPOFOL 200 MG: 10 INJECTION, EMULSION INTRAVENOUS at 01:01

## 2023-01-09 RX ADMIN — TRANEXAMIC ACID 1000 MG: 100 INJECTION, SOLUTION INTRAVENOUS at 01:01

## 2023-01-09 RX ADMIN — ATORVASTATIN CALCIUM 40 MG: 40 TABLET, FILM COATED ORAL at 09:01

## 2023-01-09 RX ADMIN — CEFAZOLIN SODIUM 2 G: 2 SOLUTION INTRAVENOUS at 01:01

## 2023-01-09 RX ADMIN — FENTANYL CITRATE 100 MCG: 50 INJECTION, SOLUTION INTRAMUSCULAR; INTRAVENOUS at 01:01

## 2023-01-09 RX ADMIN — SODIUM CHLORIDE, POTASSIUM CHLORIDE, SODIUM LACTATE AND CALCIUM CHLORIDE: 600; 310; 30; 20 INJECTION, SOLUTION INTRAVENOUS at 01:01

## 2023-01-09 RX ADMIN — SODIUM CHLORIDE, POTASSIUM CHLORIDE, SODIUM LACTATE AND CALCIUM CHLORIDE: 600; 310; 30; 20 INJECTION, SOLUTION INTRAVENOUS at 03:01

## 2023-01-09 RX ADMIN — MUPIROCIN: 20 OINTMENT TOPICAL at 09:01

## 2023-01-09 RX ADMIN — MORPHINE SULFATE 4 MG: 4 INJECTION, SOLUTION INTRAMUSCULAR; INTRAVENOUS at 09:01

## 2023-01-09 RX ADMIN — ONDANSETRON 4 MG: 2 INJECTION INTRAMUSCULAR; INTRAVENOUS at 01:01

## 2023-01-09 RX ADMIN — LIDOCAINE HYDROCHLORIDE 40 MG: 20 INJECTION, SOLUTION EPIDURAL; INFILTRATION; INTRACAUDAL; PERINEURAL at 01:01

## 2023-01-09 RX ADMIN — HYDROCODONE BITARTRATE AND ACETAMINOPHEN 1 TABLET: 10; 325 TABLET ORAL at 07:01

## 2023-01-09 RX ADMIN — PROMETHAZINE HYDROCHLORIDE 12.5 MG: 25 INJECTION INTRAMUSCULAR; INTRAVENOUS at 05:01

## 2023-01-09 RX ADMIN — HYDROMORPHONE HYDROCHLORIDE 1 MG: 2 INJECTION INTRAMUSCULAR; INTRAVENOUS; SUBCUTANEOUS at 03:01

## 2023-01-09 RX ADMIN — DEXAMETHASONE SODIUM PHOSPHATE 4 MG: 4 INJECTION, SOLUTION INTRAMUSCULAR; INTRAVENOUS at 01:01

## 2023-01-09 RX ADMIN — SUGAMMADEX 200 MG: 100 INJECTION, SOLUTION INTRAVENOUS at 03:01

## 2023-01-09 RX ADMIN — ACETAMINOPHEN 1000 MG: 10 INJECTION INTRAVENOUS at 01:01

## 2023-01-09 RX ADMIN — MORPHINE SULFATE 4 MG: 4 INJECTION, SOLUTION INTRAMUSCULAR; INTRAVENOUS at 05:01

## 2023-01-09 RX ADMIN — ROCURONIUM BROMIDE 20 MG: 10 INJECTION, SOLUTION INTRAVENOUS at 02:01

## 2023-01-09 NOTE — PLAN OF CARE
Patient awake and drowsy. Able to answer questions. Stated she in sore but not in pain. IV intact and infusing. Han draining yellow urine. VSS. Dressing and ice machine to right knee. Patient has met all criteria to be transferred to the floor. Report called to LIZETH Red and given. Bedside handoff will also be given. Patient going to ICU 5; family notified.

## 2023-01-09 NOTE — TRANSFER OF CARE
"Anesthesia Transfer of Care Note    Patient: Lynne Lester    Procedure(s) Performed: Procedure(s) (LRB):  ARTHROPLASTY, KNEE, TOTAL (Right)    Patient location: PACU    Anesthesia Type: general    Transport from OR: Transported from OR on room air with adequate spontaneous ventilation    Post pain: adequate analgesia    Post assessment: no apparent anesthetic complications    Post vital signs: stable    Level of consciousness: sedated and responds to stimulation    Nausea/Vomiting: no nausea/vomiting    Complications: none    Transfer of care protocol was followed      Last vitals:   Visit Vitals  BP (!) 117/47   Pulse 63   Temp 36.3 °C (97.3 °F)   Resp 11   Ht 5' 7" (1.702 m)   Wt 86.2 kg (190 lb)   SpO2 98%   BMI 29.76 kg/m²     "

## 2023-01-09 NOTE — PLAN OF CARE
01/09/23 1657   Discharge Assessment   Assessment Type Discharge Planning Assessment   Confirmed/corrected address, phone number and insurance Yes   Confirmed Demographics Contacted registration to update   Source of Information patient   When was your last doctors appointment? 01/02/23   Communicated [place]OR[place] with patient/caregiver Yes   Reason For Admission s/p right knee arthroplasty for pain control   People in Home child(gem), adult;spouse   Facility Arrived From: Home   Do you expect to return to your current living situation? Yes   Do you have help at home or someone to help you manage your care at home? Yes   Who are your caregiver(s) and their phone number(s)? Elmer Lester [phone]   Prior to hospitilization cognitive status: Alert/Oriented   Current cognitive status: Alert/Oriented   Home Accessibility wheelchair accessible   Home Layout Able to live on 1st floor   Equipment Currently Used at Home none   Readmission within 30 days? No   Patient currently being followed by outpatient case management? No   Do you currently have service(s) that help you manage your care at home? No   Do you take prescription medications? Yes   Do you have prescription coverage? Yes   Coverage [employer]   Do you have any problems affording any of your prescribed medications? No   Is the patient taking medications as prescribed? yes   Who is going to help you get home at discharge? Spouse, son or daughter   How do you get to doctors appointments? car, drives self   Discharge Plan A Home Health   DME Needed Upon Discharge  bedside commode;walker, rolling   Discharge Barriers Identified None   Physical Activity   On average, how many days per week do you engage in moderate to strenuous exercise (like a brisk walk)? 0 days   On average, how many minutes do you engage in exercise at this level? 0 min   Financial Resource Strain   How hard is it for you to pay for the very basics like food, housing, medical care, and heating?  Not hard   Housing Stability   In the last 12 months, was there a time when you were not able to pay the mortgage or rent on time? N   In the last 12 months, how many places have you lived? 1   In the last 12 months, was there a time when you did not have a steady place to sleep or slept in a shelter (including now)? N   Transportation Needs   In the past 12 months, has lack of transportation kept you from medical appointments or from getting medications? no   In the past 12 months, has lack of transportation kept you from meetings, work, or from getting things needed for daily living? No   Food Insecurity   Within the past 12 months, you worried that your food would run out before you got the money to buy more. Never true   Within the past 12 months, the food you bought just didn't last and you didn't have money to get more. Never true   Stress   Do you feel stress - tense, restless, nervous, or anxious, or unable to sleep at night because your mind is troubled all the time - these days? Not at all   Social Connections   In a typical week, how many times do you talk on the phone with family, friends, or neighbors? Twice a week   How often do you get together with friends or relatives? Once   How often do you attend Taoist or Sabianist services? Never   Do you belong to any clubs or organizations such as Taoist groups, unions, fraternal or athletic groups, or school groups? No   How often do you attend meetings of the clubs or organizations you belong to? Never   Are you , , , , never , or living with a partner?    Alcohol Use   Q1: How often do you have a drink containing alcohol? 4 or more ti   Q2: How many drinks containing alcohol do you have on a typical day when you are drinking? 1 or 2   Q3: How often do you have six or more drinks on one occasion? Never   OTHER   Name(s) of People in Home Son Zeferino Lester [phone], Spouse Sacha Lester [phone]      Assessment completed with very pleasant patient that is alert and oriented. Patient lives at home with spouse Sacha Lester [phone] and adult son Zeferino Lester [phone]. Patient denies having use of home health services nor have any DME currently. At time of discharge the plan is for patient to go home with a rolling walker and bedside commode chair and have [facility] per patient's choice. Patient's choice form completed per patient. No other needs voiced at this time.Case Management will continue to follow for further needs.

## 2023-01-09 NOTE — ANESTHESIA PROCEDURE NOTES
Peripheral Block    Patient location during procedure: pre-op   Block not for primary anesthetic.  Reason for block: at surgeon's request and post-op pain management   Post-op Pain Location: Right Knee   Start time: 1/9/2023 11:11 AM  Timeout: 1/9/2023 11:01 AM   End time: 1/9/2023 11:13 AM    Staffing  Authorizing Provider: Eduardo Harris MD  Performing Provider: Eduardo Harris MD    Preanesthetic Checklist  Completed: patient identified, IV checked, site marked, risks and benefits discussed, surgical consent, monitors and equipment checked, pre-op evaluation and timeout performed  Peripheral Block  Patient position: supine  Prep: ChloraPrep  Patient monitoring: heart rate, cardiac monitor, frequent blood pressure checks and continuous pulse ox  Block type: adductor canal  Laterality: right  Injection technique: single shot  Needle  Needle type: Echogenic   Needle gauge: 20 G  Needle length: 4 in  Needle localization: anatomical landmarks and ultrasound guidance  Test dose: negative     Assessment  Injection assessment: negative aspiration, negative parasthesia and local visualized surrounding nerve  Paresthesia pain: none  Heart rate change: no  Slow fractionated injection: yes  Pain Tolerance: comfortable throughout block  Medications:    Medications: ropivacaine (NAROPIN) injection 0.5% - Perineural   20 mL - 1/9/2023 11:11:00 AM

## 2023-01-09 NOTE — OP NOTE
[facility]  Orthopedic Surgery     01/09/2023     Lynne Lester  [id number]        PREOPERATIVE DIAGNOSIS:   1. Primary osteoarthritis of right knee [M17.11]     POSTOPERATIVE DIAGNOSIS:  Advanced tricompartmental arthritis, right knee     PROCEDURE:  Right total knee arthroplasty with Depuy Sigma size 4 narrow CR cemented femoral component, size 3 cemented tibial tray, 10 mm curved plus tibial poly, 13 x 30mm tibial stem, and cemented 35 mm patella button     SURGEON: Ish Alford D.O.     ASSISTANT: Orton Grinnell, CFA     ANESTHESIA:  General     BLOOD LOSS:  Less than 20 cc.      TOURNIQUET:  65 minutes     DRAINS:  None.     PATHOLOGY:  Bone cuts and synovium     COMPLICATION:  None.     INDICATIONS FOR PROCEDURE:   Ms. Lester is a 67-year-old lady who has pain in both of her knees.  Her right is worse than her left.  She has had approximately 7 years of knee pain  She has a history of falling multiple times in the past.  Walking and driving increase her symptoms while sitting and rest decreases them.  She stated, I can hear it crunching .  She denies swelling but stated she gets giving way and occasional locking.  She has taken NSAIDs with help.  She has also worn a brace with help.  She has had steroid injections as well as viscosupplementation to her right knee which although gave her some relief is no longer helps her right knee.  She can ambulate approximately 1 mile and climb approximately 2-3 stairs.  She does not use an ambulatory assistive device. She elected to proceed with surgery after she failed conservative management and complications to include bleeding, infection, scarring, nerve/blood vessel/tendon damage, need for further surgery, failed surgery, failure to improve, stiffness, skin slough, leg length discrepancy, malrotation, hardware failure, hardware breakage, and possible amputation were discussed.  She signed a consent.     PROCEDURE IN DETAIL:  The patient was  brought to the operating room and was transferred to the operating bed where all bony prominences were well padded.  General anesthesia was then administered by the Anesthesiology Department.  After general anesthesia was administered a tourniquet was applied to the upper part of the patient's right lower extremity.  The patient's right leg was then prepped with chlorhexidine solution and draped in the normal sterile fashion.  After prepping and draping bony and soft tissue landmarks were identified and an anterior longitudinal incision was drawn over the patient's knee.  The patient's leg was then elevated, exsanguinated, tourniquet was inflated.       Sharp incision was then made with a #10 blade followed by dissection to the level of the extensor mechanism.  A medial parapatellar incision was then marked and made with a #10 blade the patella was then everted and the knee was flexed presenting the distal femur.  A Reamer was utilized to make a opening at the distal femur.  A distal femoral cutting guide on intramedullary li was then placed in the patient's knee and held in place while retaining pins were placed.  After retaining pins were placed the intramedullary li was removed and a Gordy wing was utilized to ensure appropriate cut . Once an appropriate cut was ensured an oscillating saw was utilized to make a distal femoral osteotomy.  The distal femoral cutting guide was then removed along with the pins.  A distal femoral sizing guide was then placed on the distal femur and the patient was found to be a size 4. Pins were then placed for distal femoral cutting guide.  The distal femoral sizing guide was then removed from the patient's knee and a distal femoral osteotomy guide was placed.  An Gordy wing was then utilized to ensure that no notching of the anterior cortex would ensue.  Screw compression pins were then placed and the previously placed pins from the distal femoral sizing guide were removed.  An  anterior cut, posterior cut, posterior chamfer cut and anterior chamfer cut was then completed with an oscillating saw.  The distal femoral osteotomy guide was then removed along with the compression pins.  The bone fragments were removed from the patient's knee.       Attention was then placed on the proximal tibia which was presented and a external cutting guide was placed on the patient's tibia.  The external cutting guide was aligned taking 2 mm off the lowest condyle  .Once it was in the appropriate position with appropriate slope it was pinned in place.  An Gordy wing was utilized to ensure appropriate cut.  After appropriate cut was ensured and an oscillating saw was utilized to make a proximal tibia osteotomy.  After the proximal tibia osteotomy was completed the bone cut was removed from the patient's knee and then a gap  was placed in the patient's knee assuring appropriate gap and tensioning of the tissues in flexion and extension.  Once appropriate tension was ensured, the gap  was removed and the proximal tibia was presented.  Progressive proximal tibia trial components were placed on the proximal tibia until appropriate size trial was chosen and held in place while retention pins were placed.  The alignment was recheck with a drop li and found to be in appropriate position.  The drop li was removed and a tower reaming was placed and the proximal tibia was reamed followed by placement of a wedge.  The tower Reamer was removed from the tibia.  A distal femoral trial was then placed followed by progressive polys.  The patient's knee was put through full motion and good tensioning of the tissue was noted with good motion.       The patella was then presented and a caliper was utilized to check the thickness of the patella.  After the the thickness of the patella was ascertained a patella osteotomy guide was placed on the patella and a patella osteotomy was completed.  The patella osteotomy  guide was then removed and progressive lollipops were placed until an appropriate size lollipop was chosen.  Lug holes were then drilled.  A trial patella button was then placed and the patient's knee was put through full motion utilizing the no-touch technique.  The patella tracked midline.  The patella button was then removed and the patient's knee was flexed.  Femoral lug holes were then drilled followed by removal of the remaining tibia and femoral trials.       The patient's right knee was then copiously irrigated and then the proximal tibia was presented.  A proximal tibia component was then cemented in place followed by cementing of a distal femoral component.  A trial poly was then placed in the patient's knee was extended.  A final patella button was cemented in place.  Thrombin-soaked sponges were placed in the patient's knee followed by an Ace wrap.  The cement was permitted to cure.  After the cement had cured the tourniquet was released.  The thrombin-soaked sponges were then removed and full hemostasis was ensured.  Progressive tibial polys were then placed until an appropriate size poly was chosen giving good tensioning of the tissues along with motion.  A final permanent tibial poly was then placed in the patient's knee and seated.  The patient's knee was again copiously irrigated       The incision was then closed in layers beginning with the extensor mechanism being closed with FiberWire suture followed by a layered closure of the soft tissue with final plastic closure.  The incision was then dressed with Mastisol, Steri-Strips, Adaptic, sterile gauze and Ace wrap.  She was then awakened by anesthesia and transferred from the operating room to the recovery room in stable condition.  She tolerated the procedure well without complication.

## 2023-01-10 VITALS
HEIGHT: 67 IN | DIASTOLIC BLOOD PRESSURE: 69 MMHG | BODY MASS INDEX: 34.6 KG/M2 | SYSTOLIC BLOOD PRESSURE: 136 MMHG | RESPIRATION RATE: 18 BRPM | OXYGEN SATURATION: 98 % | HEART RATE: 67 BPM | WEIGHT: 220.44 LBS | TEMPERATURE: 99 F

## 2023-01-10 LAB
HCT VFR BLD AUTO: 35.6 % (ref 37–48.5)
HGB BLD-MCNC: 11.8 G/DL (ref 12–16)

## 2023-01-10 PROCEDURE — 25000003 PHARM REV CODE 250: Performed by: ORTHOPAEDIC SURGERY

## 2023-01-10 PROCEDURE — 97116 GAIT TRAINING THERAPY: CPT

## 2023-01-10 PROCEDURE — 85018 HEMOGLOBIN: CPT | Performed by: ORTHOPAEDIC SURGERY

## 2023-01-10 PROCEDURE — 25000003 PHARM REV CODE 250: Performed by: STUDENT IN AN ORGANIZED HEALTH CARE EDUCATION/TRAINING PROGRAM

## 2023-01-10 PROCEDURE — 97162 PT EVAL MOD COMPLEX 30 MIN: CPT

## 2023-01-10 PROCEDURE — 85014 HEMATOCRIT: CPT | Performed by: ORTHOPAEDIC SURGERY

## 2023-01-10 PROCEDURE — 63600175 PHARM REV CODE 636 W HCPCS: Performed by: STUDENT IN AN ORGANIZED HEALTH CARE EDUCATION/TRAINING PROGRAM

## 2023-01-10 RX ADMIN — MORPHINE SULFATE 4 MG: 4 INJECTION, SOLUTION INTRAMUSCULAR; INTRAVENOUS at 07:01

## 2023-01-10 RX ADMIN — PROPRANOLOL HYDROCHLORIDE 30 MG: 10 TABLET ORAL at 08:01

## 2023-01-10 RX ADMIN — CEFAZOLIN SODIUM 1 G: 1 SOLUTION INTRAVENOUS at 05:01

## 2023-01-10 RX ADMIN — HYDROCODONE BITARTRATE AND ACETAMINOPHEN 1 TABLET: 10; 325 TABLET ORAL at 01:01

## 2023-01-10 RX ADMIN — FLUOXETINE 20 MG: 10 CAPSULE ORAL at 08:01

## 2023-01-10 RX ADMIN — HYDROCODONE BITARTRATE AND ACETAMINOPHEN 1 TABLET: 10; 325 TABLET ORAL at 08:01

## 2023-01-10 RX ADMIN — LEVOTHYROXINE SODIUM 88 MCG: 88 TABLET ORAL at 05:01

## 2023-01-10 NOTE — HOSPITAL COURSE
Ms. Lester was admitted on 01/09/2023 thru same day surgery taken to the operating room where a right total knee arthroplasty was completed.  For a full account of surgery please see the op report.  Postoperatively she was placed on immediate postop DVT prophylaxis with Fawad patel, SCDs, early motion, and Xarelto therapy.  Physical therapy was consulted who ambulated the patient with a walker and assistance with weight-bearing at tolerance to the right lower extremity for a distance greater than 100 ft.  Occupational therapy was consulted who taught the patient ADLs.   was consulted to help arrange for home health and home DME.  Her H&H is were followed and the remained stable throughout her stay.  She was discharged on her 1st post admission day in stable condition able to ambulate greater than 100 ft with instructions to follow up in the office in 2 weeks.

## 2023-01-10 NOTE — ASSESSMENT & PLAN NOTE
PT/OT  Remove espana when able  PRN pain and nausea medications  xarelto per ortho  Ortho consulted and following

## 2023-01-10 NOTE — SUBJECTIVE & OBJECTIVE
Past Medical History:   Diagnosis Date    Carpal tunnel syndrome     Bilateral    Hypertension     Hypothyroidism 2/17/2022    Osteoarthritis        Past Surgical History:   Procedure Laterality Date    APPENDECTOMY      BARIATRIC SURGERY      BLADDER REPAIR      CATARACT EXTRACTION, BILATERAL      COLONOSCOPY N/A 09/08/2022    Procedure: COLONOSCOPY;  Surgeon: Carine Dc MD;  Location: [facility];  Service: General;  Laterality: N/A;    HYSTERECTOMY      OOPHORECTOMY      TONSILLECTOMY         Review of patient's allergies indicates:   Allergen Reactions    Codeine Nausea And Vomiting     Likely not a true allergy. Nausea is known reaction to opioid medications       No current facility-administered medications on file prior to encounter.     Current Outpatient Medications on File Prior to Encounter   Medication Sig    atorvastatin (LIPITOR) 40 MG tablet TAKE 1 TABLET EVERY EVENING    cholecalciferol, vitamin D3, 125 mcg (5,000 unit) capsule Take 5,000 Units by mouth.    FLUoxetine 20 MG capsule Take 1 capsule (20 mg total) by mouth once daily.    ibandronate (BONIVA) 150 mg tablet Take 1 tablet (150 mg total) by mouth every 30 days.    levothyroxine (SYNTHROID) 88 MCG tablet Take 1 tablet (88 mcg total) by mouth before breakfast.    meloxicam (MOBIC) 15 MG tablet TAKE 1 TABLET DAILY    propranoloL (INDERAL LA) 60 MG 24 hr capsule TAKE 1 CAPSULE DAILY    predniSONE (DELTASONE) 20 MG tablet 60 mg po q am x 3 days then 40 mg po q am x 3 days then 20 mg q am x 2 days then stop    sumatriptan (IMITREX) 100 MG tablet Take 1 tablet (100 mg total) by mouth every 2 (two) hours as needed for Migraine.    sumatriptan (IMITREX) 50 MG tablet sumatriptan 50 mg tablet     Family History       Problem Relation (Age of Onset)    Alzheimer's disease Mother    Breast cancer Maternal Aunt    COPD Father    Cancer Paternal Grandmother          Tobacco Use    Smoking status: Former    Smokeless tobacco: Never   Substance and Sexual  Activity    Alcohol use: Yes    Drug use: Never    Sexual activity: Not on file     Review of Systems   All other systems reviewed and are negative.  Objective:     Vital Signs (Most Recent):  Temp: 97.9 °F (36.6 °C) (01/09/23 1640)  Pulse: 82 (01/09/23 1640)  Resp: 16 (01/09/23 1738)  BP: (!) 138/91 (01/09/23 1640)  SpO2: 96 % (01/09/23 1640)   Vital Signs (24h Range):  Temp:  [97.3 °F (36.3 °C)-97.9 °F (36.6 °C)] 97.9 °F (36.6 °C)  Pulse:  [63-88] 82  Resp:  [10-19] 16  SpO2:  [95 %-100 %] 96 %  BP: (117-156)/(47-94) 138/91     Weight: 100 kg (220 lb 7.4 oz)  Body mass index is 34.53 kg/m².    Physical Exam  Vitals reviewed.   Constitutional:       Appearance: Normal appearance.   HENT:      Head: Normocephalic and atraumatic.   Eyes:      Extraocular Movements: Extraocular movements intact.      Pupils: Pupils are equal, round, and reactive to light.   Cardiovascular:      Rate and Rhythm: Normal rate.   Pulmonary:      Effort: Pulmonary effort is normal. No respiratory distress.   Abdominal:      Palpations: Abdomen is soft.      Tenderness: There is no abdominal tenderness.   Musculoskeletal:      Left lower leg: No edema.      Comments: Right knee wrapped. No bleeding noted   Skin:     General: Skin is warm and dry.   Neurological:      General: No focal deficit present.      Mental Status: She is alert and oriented to person, place, and time.   Psychiatric:         Mood and Affect: Mood normal.         Behavior: Behavior normal.         CRANIAL NERVES     CN III, IV, VI   Pupils are equal, round, and reactive to light.     Significant Labs: All pertinent labs within the past 24 hours have been reviewed.    Significant Imaging: I have reviewed all pertinent imaging results/findings within the past 24 hours.

## 2023-01-10 NOTE — NURSING
Administered AM meds at this time. Also administered Norco for pain s/p manipulation by Dr. Alford.  Tolerated well. Dr. Contreras at bedside at this time.

## 2023-01-10 NOTE — NURSING
IV D/c'd. Catheter tip intact. Pressure dressing applied. Tolerated well. DC instructions given to patient and daughter. Verbalized understanding. All needs met. Transported to private vehicle via wheelchair. All belongings with patient.

## 2023-01-10 NOTE — NURSING
Patient sitting up in bed eating breakfast. C/o pain she rates level 9/10 to right knee. Daughter at bedside.  Will medicate

## 2023-01-10 NOTE — PROGRESS NOTES
S:  Ms. Lester was seen examined.  She was resting comfortably in bed.  She had a right total knee arthroplasty completed yesterday on 01/09/2023.  She was seen by Physical therapy in ambulated greater than 100 ft.  She has been working on knee extension exercises since surgery.    O:  Vital signs stable, reviewed.    Neurovascularly intact.    Dressing clean dry and intact without blood tinging.    Incision clean dry and intact.    Good motion the patient's knee with minimal pain.  Mild swelling right knee.  Radiography:  X-rays of the right knee completed on 01/09/2023 showed a well-seated well-aligned total knee arthroplasty.    Laboratory:  Hemoglobin 11.8; hematocrit 35.6.      A:  Right total knee arthroplasty, POD #1.    P:  1. Continue to elevate and ice right knee.    2. Change dressing to a silver alginate Aquacel dressing.  3. May shower do not soak in a tub may allow water to run over dressing is a sealed waterproof dressing.    4. Do aggressive knee extension exercises.    5. Walk with a walker with weight-bearing at tolerance to the right lower extremity.  6. Start doing straight leg raises.    7. Patient is orthopedically stable for discharge home may discharge when okay with internal medicine/hospitalist.  8. Orthopedic discharge instructions.      A. Elevate and ice right knee.      B. Do not remove dressing.  May shower do not soak in a tub may allow water to run over dressing it is a sealed waterproof dressing.      C. Do aggressive knee extension exercises.      D. Walk with a walker with weight-bearing at tolerance to the right lower extremity.      E. Follow-up in the office in approximately 2 weeks.

## 2023-01-10 NOTE — H&P
[facility] Medicine  History & Physical    Patient Name: Lynne Lester  MRN: [id number]  Patient Class: OP- Outpatient Recovery  Admission Date: 1/9/2023  Attending Physician: Obdulio Contreras MD   Primary Care Provider: Tyler Moreno MD         Patient information was obtained from patient and past medical records.     Subjective:     Principal Problem:S/P total knee arthroplasty, right    Chief Complaint: Knee pain       HPI: 68 yo w/ hx of HTN, Hypothyroidism being admitted postoperatively following right total knee arthroplasty. Procedure uncomplicated. Patient doing well postoperatively only complaining of pain in knee.      Past Medical History:   Diagnosis Date    Carpal tunnel syndrome     Bilateral    Hypertension     Hypothyroidism 2/17/2022    Osteoarthritis        Past Surgical History:   Procedure Laterality Date    APPENDECTOMY      BARIATRIC SURGERY      BLADDER REPAIR      CATARACT EXTRACTION, BILATERAL      COLONOSCOPY N/A 09/08/2022    Procedure: COLONOSCOPY;  Surgeon: Carine Dc MD;  Location: [facility];  Service: General;  Laterality: N/A;    HYSTERECTOMY      OOPHORECTOMY      TONSILLECTOMY         Review of patient's allergies indicates:   Allergen Reactions    Codeine Nausea And Vomiting     Likely not a true allergy. Nausea is known reaction to opioid medications       No current facility-administered medications on file prior to encounter.     Current Outpatient Medications on File Prior to Encounter   Medication Sig    atorvastatin (LIPITOR) 40 MG tablet TAKE 1 TABLET EVERY EVENING    cholecalciferol, vitamin D3, 125 mcg (5,000 unit) capsule Take 5,000 Units by mouth.    FLUoxetine 20 MG capsule Take 1 capsule (20 mg total) by mouth once daily.    ibandronate (BONIVA) 150 mg tablet Take 1 tablet (150 mg total) by mouth every 30 days.    levothyroxine (SYNTHROID) 88 MCG tablet Take 1 tablet (88 mcg total) by mouth before breakfast.     meloxicam (MOBIC) 15 MG tablet TAKE 1 TABLET DAILY    propranoloL (INDERAL LA) 60 MG 24 hr capsule TAKE 1 CAPSULE DAILY    predniSONE (DELTASONE) 20 MG tablet 60 mg po q am x 3 days then 40 mg po q am x 3 days then 20 mg q am x 2 days then stop    sumatriptan (IMITREX) 100 MG tablet Take 1 tablet (100 mg total) by mouth every 2 (two) hours as needed for Migraine.    sumatriptan (IMITREX) 50 MG tablet sumatriptan 50 mg tablet     Family History       Problem Relation (Age of Onset)    Alzheimer's disease Mother    Breast cancer Maternal Aunt    COPD Father    Cancer Paternal Grandmother          Tobacco Use    Smoking status: Former    Smokeless tobacco: Never   Substance and Sexual Activity    Alcohol use: Yes    Drug use: Never    Sexual activity: Not on file     Review of Systems   All other systems reviewed and are negative.  Objective:     Vital Signs (Most Recent):  Temp: 97.9 °F (36.6 °C) (01/09/23 1640)  Pulse: 82 (01/09/23 1640)  Resp: 16 (01/09/23 1738)  BP: (!) 138/91 (01/09/23 1640)  SpO2: 96 % (01/09/23 1640)   Vital Signs (24h Range):  Temp:  [97.3 °F (36.3 °C)-97.9 °F (36.6 °C)] 97.9 °F (36.6 °C)  Pulse:  [63-88] 82  Resp:  [10-19] 16  SpO2:  [95 %-100 %] 96 %  BP: (117-156)/(47-94) 138/91     Weight: 100 kg (220 lb 7.4 oz)  Body mass index is 34.53 kg/m².    Physical Exam  Vitals reviewed.   Constitutional:       Appearance: Normal appearance.   HENT:      Head: Normocephalic and atraumatic.   Eyes:      Extraocular Movements: Extraocular movements intact.      Pupils: Pupils are equal, round, and reactive to light.   Cardiovascular:      Rate and Rhythm: Normal rate.   Pulmonary:      Effort: Pulmonary effort is normal. No respiratory distress.   Abdominal:      Palpations: Abdomen is soft.      Tenderness: There is no abdominal tenderness.   Musculoskeletal:      Left lower leg: No edema.      Comments: Right knee wrapped. No bleeding noted   Skin:     General: Skin is warm and dry.    Neurological:      General: No focal deficit present.      Mental Status: She is alert and oriented to person, place, and time.   Psychiatric:         Mood and Affect: Mood normal.         Behavior: Behavior normal.         CRANIAL NERVES     CN III, IV, VI   Pupils are equal, round, and reactive to light.     Significant Labs: All pertinent labs within the past 24 hours have been reviewed.    Significant Imaging: I have reviewed all pertinent imaging results/findings within the past 24 hours.    Assessment/Plan:     * S/P total knee arthroplasty, right  PT/OT  Remove espana when able  PRN pain and nausea medications  xarelto per ortho  Ortho consulted and following      Hyperlipidemia  Continue statin      Hypothyroidism  Continue synthroid      Chronic migraine without aura without status migrainosus, not intractable  Continue propanolol        VTE Risk Mitigation (From admission, onward)         Ordered     rivaroxaban tablet 10 mg  With dinner         01/09/23 1650     Place sequential compression device  Until discontinued         01/09/23 1650     Place ARMANDO hose  Until discontinued         01/09/23 1650                   Obdulio Contreras MD  Department of Hospital Medicine   [place] [facility] Int[facility]

## 2023-01-10 NOTE — PT/OT/SLP EVAL
Physical Therapy Evaluation    Patient Name:  Lynne Lester   MRN:  [id number]    Recommendations:     Discharge Recommendations: home with home health   Discharge Equipment Recommendations: walker, rolling, bedside commode   Barriers to discharge: None    Assessment:     Lynne Lester is a 67 y.o. female admitted with a medical diagnosis of S/P total knee arthroplasty, right.  She presents with the following impairments/functional limitations: weakness, impaired endurance, impaired self care skills, impaired functional mobility, decreased ROM, impaired joint extensibility, orthopedic precautions.    Rehab Prognosis: Good; patient would benefit from acute skilled PT services to address these deficits and reach maximum level of function.    Recent Surgery: Procedure(s) (LRB):  ARTHROPLASTY, KNEE, TOTAL (Right) 1 Day Post-Op    Plan:     During this hospitalization, patient to be seen  (initial evaluation and treatment only) to address the identified rehab impairments via gait training, therapeutic activities, therapeutic exercises and progress toward the following goals:    Plan of Care Expires:  01/10/23    Subjective     Chief Complaint: s/p right TKA  Patient/Family Comments/goals: decrease pain and improve mobility  Pain/Comfort:  Pain Rating 1:  (8-9/10 right knee)  Pain Addressed 1: Pre-medicate for activity, Reposition, Distraction    Patients cultural, spiritual, Taoist conflicts given the current situation:      Living Environment:  Patient lives with her son and sister in a SS home with no steps to enter.  Prior to admission, patients level of function was independent.  Equipment used at home: none.  DME owned (not currently used): none.  Upon discharge, patient will have assistance from family.    Objective:     Communicated with RN prior to session.  Patient found HOB elevated with cryotherapy, peripheral IV  upon PT entry to room.    General Precautions: Standard,    Orthopedic  Precautions:RLE weight bearing as tolerated (agressive extension exercises, encourage SLR's)   Braces: N/A  Respiratory Status: Room air    Exams:  Cognitive Exam:  Patient is oriented to Person, Place, Time, and Situation  RLE ROM: limited as would be expected following surgical procedure performed and orthopedic precautions  RLE Strength: imited as would be expected following surgical procedure performed and orthopedic precautions  LLE ROM: WFL  LLE Strength: WFL    Functional Mobility:  Bed Mobility:  Supine to Sit: contact guard assistance  Transfers:  Sit to Stand:  contact guard assistance with rolling walker  Gait: patient ambulated 150' with CGA using RW, RLE WBAT    Treatment & Education:  Patient tolerated initial eval/treatment well. She performed bed mobility and transfers with CGA and ambulated 150' with CGA using RW, RLE WBAT, and demonstrating antalgic gait pattern. Patient instructed in and performed QS, SLR, seated heel slides, and LAQ's. Patient educated in WBAT RLE, use of cryotherapy to right knee throughout the day to manage pain and swelling, importance of achieving TKE as soon as possible to prevent contractures and restore normal knee biomechanics during walking and standing, positioning of RLE to facilitate knee extension Additional education provided included call light use, importance of OOB activity and functional mobility to negate the negative effects of prolonged bed rest during this hospitalization, safe transfers/ambulation and discharge planning recommendations/options.     Patient left sitting edge of bed with all lines intact, call button in reach, RN notified, and daughter present.    GOALS:   Patient to verbalize/demonstrate understanding of instructions and education provided.      History:     Past Medical History:   Diagnosis Date    Carpal tunnel syndrome     Bilateral    Hypertension     Hypothyroidism 2/17/2022    Osteoarthritis        Past Surgical History:   Procedure  Laterality Date    APPENDECTOMY      BARIATRIC SURGERY      BLADDER REPAIR      CATARACT EXTRACTION, BILATERAL      COLONOSCOPY N/A 09/08/2022    Procedure: COLONOSCOPY;  Surgeon: Carine Dc MD;  Location: [facility];  Service: General;  Laterality: N/A;    HYSTERECTOMY      OOPHORECTOMY      TONSILLECTOMY         Time Tracking:     PT Received On: 01/10/23  PT Start Time: 0856     PT Stop Time: 0926  PT Total Time (min): 30 min     Billable Minutes: Evaluation 10 min and Gait Training 20 min      01/10/2023

## 2023-01-10 NOTE — PLAN OF CARE
01/10/23 1020   Final Note   Assessment Type Final Discharge Note   Anticipated Discharge Disposition Home-Health   What phone number can be called within the next 1-3 days to see how you are doing after discharge? [phone]   Hospital Resources/Appts/Education Provided Appointments scheduled and added to AVS   Post-Acute Status   Post-Acute Authorization Home Health   Home Health Status Set-up Complete/Auth obtained   Discharge Delays None known at this time     Verbal & written follow up appointment with Dr Alford provided to patient. Also provided her with rolling walker & BSCC from [facility]. [facility] set up for home health. Demonstrated understanding by verbal feedback. Denies any other needs at this time. Family at bedside.

## 2023-01-11 PROCEDURE — G0180 MD CERTIFICATION HHA PATIENT: HCPCS | Mod: ,,, | Performed by: FAMILY MEDICINE

## 2023-01-11 PROCEDURE — G0180 PR HOME HEALTH MD CERTIFICATION: ICD-10-PCS | Mod: ,,, | Performed by: FAMILY MEDICINE

## 2023-01-11 NOTE — ANESTHESIA POSTPROCEDURE EVALUATION
Anesthesia Post Evaluation    Patient: Lynne Lester    Procedure(s) Performed: Procedure(s) (LRB):  ARTHROPLASTY, KNEE, TOTAL (Right)    Final Anesthesia Type: general      Patient location during evaluation: PACU  Patient participation: Yes- Able to Participate  Level of consciousness: awake and alert  Post-procedure vital signs: reviewed and stable  Pain management: adequate  Airway patency: patent    PONV status at discharge: No PONV  Anesthetic complications: no      Cardiovascular status: blood pressure returned to baseline  Respiratory status: unassisted  Hydration status: euvolemic  Follow-up not needed.          Vitals Value Taken Time   /69 01/10/23 0841   Temp 37.2 °C (99 °F) 01/10/23 0730   Pulse 67 01/10/23 0841   Resp 18 01/10/23 0840   SpO2 98 % 01/10/23 0730         Event Time   Out of Recovery 16:45:19         Pain/Nabila Score: Pain Rating Prior to Med Admin: 9 (1/10/2023  8:40 AM)  Pain Rating Post Med Admin: 3 (1/10/2023  2:51 AM)  Nabila Score: 10 (1/9/2023  4:35 PM)

## 2023-01-20 ENCOUNTER — OFFICE VISIT (OUTPATIENT)
Dept: ORTHOPEDICS | Facility: CLINIC | Age: 68
End: 2023-01-20
Payer: MEDICARE

## 2023-01-20 VITALS — HEIGHT: 67 IN | BODY MASS INDEX: 34.6 KG/M2 | RESPIRATION RATE: 16 BRPM | WEIGHT: 220.44 LBS

## 2023-01-20 DIAGNOSIS — M17.12 PRIMARY OSTEOARTHRITIS OF LEFT KNEE: ICD-10-CM

## 2023-01-20 DIAGNOSIS — Z96.651 S/P TKR (TOTAL KNEE REPLACEMENT), RIGHT: Primary | ICD-10-CM

## 2023-01-20 PROCEDURE — 99213 OFFICE O/P EST LOW 20 MIN: CPT | Mod: 24,25,S$GLB, | Performed by: ORTHOPAEDIC SURGERY

## 2023-01-20 PROCEDURE — 1125F PR PAIN SEVERITY QUANTIFIED, PAIN PRESENT: ICD-10-PCS | Mod: CPTII,S$GLB,, | Performed by: ORTHOPAEDIC SURGERY

## 2023-01-20 PROCEDURE — 1101F PT FALLS ASSESS-DOCD LE1/YR: CPT | Mod: CPTII,S$GLB,, | Performed by: ORTHOPAEDIC SURGERY

## 2023-01-20 PROCEDURE — 99024 PR POST-OP FOLLOW-UP VISIT: ICD-10-PCS | Mod: S$GLB,,, | Performed by: ORTHOPAEDIC SURGERY

## 2023-01-20 PROCEDURE — 99213 PR OFFICE/OUTPT VISIT, EST, LEVL III, 20-29 MIN: ICD-10-PCS | Mod: 24,25,S$GLB, | Performed by: ORTHOPAEDIC SURGERY

## 2023-01-20 PROCEDURE — 1159F PR MEDICATION LIST DOCUMENTED IN MEDICAL RECORD: ICD-10-PCS | Mod: CPTII,S$GLB,, | Performed by: ORTHOPAEDIC SURGERY

## 2023-01-20 PROCEDURE — 99024 POSTOP FOLLOW-UP VISIT: CPT | Mod: S$GLB,,, | Performed by: ORTHOPAEDIC SURGERY

## 2023-01-20 PROCEDURE — 1101F PR PT FALLS ASSESS DOC 0-1 FALLS W/OUT INJ PAST YR: ICD-10-PCS | Mod: CPTII,S$GLB,, | Performed by: ORTHOPAEDIC SURGERY

## 2023-01-20 PROCEDURE — 99999 PR PBB SHADOW E&M-EST. PATIENT-LVL III: ICD-10-PCS | Mod: PBBFAC,,, | Performed by: ORTHOPAEDIC SURGERY

## 2023-01-20 PROCEDURE — 3288F FALL RISK ASSESSMENT DOCD: CPT | Mod: CPTII,S$GLB,, | Performed by: ORTHOPAEDIC SURGERY

## 2023-01-20 PROCEDURE — 3008F BODY MASS INDEX DOCD: CPT | Mod: CPTII,S$GLB,, | Performed by: ORTHOPAEDIC SURGERY

## 2023-01-20 PROCEDURE — 1159F MED LIST DOCD IN RCRD: CPT | Mod: CPTII,S$GLB,, | Performed by: ORTHOPAEDIC SURGERY

## 2023-01-20 PROCEDURE — 99999 PR PBB SHADOW E&M-EST. PATIENT-LVL III: CPT | Mod: PBBFAC,,, | Performed by: ORTHOPAEDIC SURGERY

## 2023-01-20 PROCEDURE — 3008F PR BODY MASS INDEX (BMI) DOCUMENTED: ICD-10-PCS | Mod: CPTII,S$GLB,, | Performed by: ORTHOPAEDIC SURGERY

## 2023-01-20 PROCEDURE — 3288F PR FALLS RISK ASSESSMENT DOCUMENTED: ICD-10-PCS | Mod: CPTII,S$GLB,, | Performed by: ORTHOPAEDIC SURGERY

## 2023-01-20 PROCEDURE — 20610 LARGE JOINT ASPIRATION/INJECTION: L KNEE: ICD-10-PCS | Mod: 79,LT,S$GLB, | Performed by: ORTHOPAEDIC SURGERY

## 2023-01-20 PROCEDURE — 20610 DRAIN/INJ JOINT/BURSA W/O US: CPT | Mod: 79,LT,S$GLB, | Performed by: ORTHOPAEDIC SURGERY

## 2023-01-20 PROCEDURE — 1125F AMNT PAIN NOTED PAIN PRSNT: CPT | Mod: CPTII,S$GLB,, | Performed by: ORTHOPAEDIC SURGERY

## 2023-01-20 RX ORDER — HYDROCODONE BITARTRATE AND ACETAMINOPHEN 7.5; 325 MG/1; MG/1
1 TABLET ORAL EVERY 6 HOURS PRN
Qty: 20 TABLET | Refills: 0 | Status: SHIPPED | OUTPATIENT
Start: 2023-01-20 | End: 2023-01-30

## 2023-01-20 RX ORDER — RIVAROXABAN 10 MG/1
10 TABLET, FILM COATED ORAL
COMMUNITY
Start: 2022-12-31 | End: 2023-01-30

## 2023-01-20 RX ORDER — OXYCODONE AND ACETAMINOPHEN 7.5; 325 MG/1; MG/1
1 TABLET ORAL
COMMUNITY
Start: 2022-12-31 | End: 2023-01-30

## 2023-01-20 NOTE — PROGRESS NOTES
Subjective:      Patient ID: Lynne Lester is a 67 y.o. female.    Chief Complaint: Post-op Evaluation and Pain of the Right Knee      HPI:  Ms. Lester returned today with complaints of persistent pain in her left knee.  She also is here for follow-up ulna right total knee arthroplasty.  Her date of surgery 01/09/2023.  She has been doing home health physical therapy and is doing well.  Her pain is well controlled.  When she bears weight on her left knee she has some pain.  She has taken NSAIDs which helped.    ROS:  New diagnosis/surgery/prescriptions since last office visit on 12/30/2022: Right total knee arthroplasty.  Constitutional: Negative for chills and fever.   HENT: Negative for congestion.    Eyes: Negative for blurred vision.   Cardiovascular: Negative for chest pain.   Respiratory: Negative for cough.    Endocrine: Negative for polydipsia.   Hematologic/Lymphatic: Negative for adenopathy.   Skin: Negative for flushing and itching.   Musculoskeletal: Positive for joint pain. Negative for gout.   Gastrointestinal: Negative for constipation, diarrhea and heartburn.   Genitourinary: Negative for nocturia.   Neurological: Positive for headaches. Negative for seizures.   Psychiatric/Behavioral: Positive for depression. Negative for substance abuse. The patient is not nervous/anxious.    Allergic/Immunologic: Positive for environmental allergies.       Objective:      Physical Exam:   General: AAOx3.  No acute distress  Vascular:  Pulses intact and equal bilaterally.  Capillary refill less than 3 seconds and equal bilaterally  Neurologic:  Pinprick and soft touch intact and equal bilaterally  Integment:  Incision well approximated and healing well.  Moderate right lower extremity ecchymosis.  Extremity:  Knee: Extension/flexion right knee 0/90 degrees, left knee 0/118 degrees.  Crepitus with motion left knee.  Mild effusion left knee.  Varus/valgus stressing with good endpoint both knees.  Evans cyst  left knee.  Positive joint line tenderness left knee.  Seguin positive left knee.  Calves soft.  Homans negative.  Radiography:  No new x-rays done today.      Assessment:       Impression:     1.   2.  Tricompartmental arthritis, left knee.    Status post right total knee arthroplasty.         Plan:       1.  Discussed physical examination with the patient. Lynne understands that she has exacerbated the tricompartmental arthritis of her left knee and also had a right total knee arthroplasty in the recent past.  With respect to her left knee treatment alternatives and outcomes were discussed with the patient she understands she could be treated conservatively with observation, activity modification, NSAIDs, bracing, physical therapy, injections, or she could consider surgical intervention such as total knee arthroplasty.  She recently had a total knee arthroplasty on the right so recommend she delay left total knee arthroplasty until she has completed rehab on her right knee.  She was submitted for viscosupplementation in the past she could have viscosupplementation today as it has been preauthorized.  2. Offered Synvisc-One to the left knee, she elected to proceed.    3. Refer to physical therapy to start a total knee rehab program.  4. [place] 7.5/325, 1 p.o. q.4-6 hours p.r.n. pain, dispense 20, refill 0, prescription was forwarded to the patient's pharmacy by [facility].  She understands this will be the last prescription for narcotics.  5. Continue doing home exercises such as knee extension exercises and straight leg raises.    6. Continue to ambulate with a walker with weight-bearing at tolerance to the right lower extremity.  7. Follow up in 1 month with an x-ray of the right knee.

## 2023-01-20 NOTE — PROCEDURES
Large Joint Aspiration/Injection: L knee    Date/Time: 1/20/2023 8:15 AM  Performed by: Ish Alford DO  Authorized by: Ish Alford, DO     Consent Done?:  Yes (Verbal)  Indications:  Arthritis, diagnostic evaluation, joint swelling and pain  Site marked: the procedure site was marked    Timeout: prior to procedure the correct patient, procedure, and site was verified    Prep: patient was prepped and draped in usual sterile fashion      Details:  Needle Size:  22 G  Ultrasonic Guidance for needle placement?: No    Approach:  Anterolateral  Location:  Knee  Site:  L knee  Medications:  88 mg hyaluronate sodium, stabilized 88 mg/4 mL  Patient tolerance:  Patient tolerated the procedure well with no immediate complications

## 2023-01-20 NOTE — PROCEDURES
Large Joint Aspiration/Injection: L knee    Date/Time: 1/20/2023 8:15 AM  Performed by: Ish Alford DO  Authorized by: Ish Alford, DO     Consent Done?:  Yes (Verbal)  Indications:  Arthritis, diagnostic evaluation, joint swelling and pain  Site marked: the procedure site was marked    Timeout: prior to procedure the correct patient, procedure, and site was verified    Prep: patient was prepped and draped in usual sterile fashion      Details:  Needle Size:  22 G  Ultrasonic Guidance for needle placement?: No    Approach:  Anterolateral  Location:  Knee  Site:  L knee  Medications:  48 mg hylan g-f 20 48 mg/6 mL  Patient tolerance:  Patient tolerated the procedure well with no immediate complications

## 2023-01-24 ENCOUNTER — DOCUMENTATION ONLY (OUTPATIENT)
Dept: ORTHOPEDICS | Facility: CLINIC | Age: 68
End: 2023-01-24
Payer: MEDICARE

## 2023-01-24 NOTE — PROGRESS NOTES
Physical therapy order was successfully faxed to [facility] in [facility] at [phone].

## 2023-01-30 ENCOUNTER — OFFICE VISIT (OUTPATIENT)
Dept: FAMILY MEDICINE | Facility: CLINIC | Age: 68
End: 2023-01-30
Payer: MEDICARE

## 2023-01-30 VITALS
OXYGEN SATURATION: 99 % | DIASTOLIC BLOOD PRESSURE: 82 MMHG | SYSTOLIC BLOOD PRESSURE: 126 MMHG | TEMPERATURE: 99 F | BODY MASS INDEX: 29.95 KG/M2 | RESPIRATION RATE: 18 BRPM | HEIGHT: 67 IN | WEIGHT: 190.81 LBS | HEART RATE: 70 BPM

## 2023-01-30 DIAGNOSIS — M81.0 AGE RELATED OSTEOPOROSIS, UNSPECIFIED PATHOLOGICAL FRACTURE PRESENCE: ICD-10-CM

## 2023-01-30 DIAGNOSIS — E03.9 HYPOTHYROIDISM, UNSPECIFIED TYPE: Primary | ICD-10-CM

## 2023-01-30 DIAGNOSIS — G43.709 CHRONIC MIGRAINE WITHOUT AURA WITHOUT STATUS MIGRAINOSUS, NOT INTRACTABLE: ICD-10-CM

## 2023-01-30 DIAGNOSIS — E78.5 HYPERLIPIDEMIA, UNSPECIFIED HYPERLIPIDEMIA TYPE: ICD-10-CM

## 2023-01-30 PROCEDURE — 1126F PR PAIN SEVERITY QUANTIFIED, NO PAIN PRESENT: ICD-10-PCS | Mod: CPTII,S$GLB,, | Performed by: FAMILY MEDICINE

## 2023-01-30 PROCEDURE — 99999 PR PBB SHADOW E&M-EST. PATIENT-LVL III: CPT | Mod: PBBFAC,,, | Performed by: FAMILY MEDICINE

## 2023-01-30 PROCEDURE — 1126F AMNT PAIN NOTED NONE PRSNT: CPT | Mod: CPTII,S$GLB,, | Performed by: FAMILY MEDICINE

## 2023-01-30 PROCEDURE — 1160F PR REVIEW ALL MEDS BY PRESCRIBER/CLIN PHARMACIST DOCUMENTED: ICD-10-PCS | Mod: CPTII,S$GLB,, | Performed by: FAMILY MEDICINE

## 2023-01-30 PROCEDURE — 99999 PR PBB SHADOW E&M-EST. PATIENT-LVL III: ICD-10-PCS | Mod: PBBFAC,,, | Performed by: FAMILY MEDICINE

## 2023-01-30 PROCEDURE — 3074F PR MOST RECENT SYSTOLIC BLOOD PRESSURE < 130 MM HG: ICD-10-PCS | Mod: CPTII,S$GLB,, | Performed by: FAMILY MEDICINE

## 2023-01-30 PROCEDURE — 99213 PR OFFICE/OUTPT VISIT, EST, LEVL III, 20-29 MIN: ICD-10-PCS | Mod: S$GLB,,, | Performed by: FAMILY MEDICINE

## 2023-01-30 PROCEDURE — 1160F RVW MEDS BY RX/DR IN RCRD: CPT | Mod: CPTII,S$GLB,, | Performed by: FAMILY MEDICINE

## 2023-01-30 PROCEDURE — 3079F PR MOST RECENT DIASTOLIC BLOOD PRESSURE 80-89 MM HG: ICD-10-PCS | Mod: CPTII,S$GLB,, | Performed by: FAMILY MEDICINE

## 2023-01-30 PROCEDURE — 1159F PR MEDICATION LIST DOCUMENTED IN MEDICAL RECORD: ICD-10-PCS | Mod: CPTII,S$GLB,, | Performed by: FAMILY MEDICINE

## 2023-01-30 PROCEDURE — 1159F MED LIST DOCD IN RCRD: CPT | Mod: CPTII,S$GLB,, | Performed by: FAMILY MEDICINE

## 2023-01-30 PROCEDURE — 3074F SYST BP LT 130 MM HG: CPT | Mod: CPTII,S$GLB,, | Performed by: FAMILY MEDICINE

## 2023-01-30 PROCEDURE — 3008F BODY MASS INDEX DOCD: CPT | Mod: CPTII,S$GLB,, | Performed by: FAMILY MEDICINE

## 2023-01-30 PROCEDURE — 3008F PR BODY MASS INDEX (BMI) DOCUMENTED: ICD-10-PCS | Mod: CPTII,S$GLB,, | Performed by: FAMILY MEDICINE

## 2023-01-30 PROCEDURE — 3079F DIAST BP 80-89 MM HG: CPT | Mod: CPTII,S$GLB,, | Performed by: FAMILY MEDICINE

## 2023-01-30 PROCEDURE — 99213 OFFICE O/P EST LOW 20 MIN: CPT | Mod: S$GLB,,, | Performed by: FAMILY MEDICINE

## 2023-01-30 RX ORDER — LEVOTHYROXINE SODIUM 88 UG/1
88 TABLET ORAL
Qty: 90 TABLET | Refills: 3 | Status: SHIPPED | OUTPATIENT
Start: 2023-01-30

## 2023-01-30 RX ORDER — FLUOXETINE HYDROCHLORIDE 20 MG/1
20 CAPSULE ORAL DAILY
Qty: 90 CAPSULE | Refills: 3 | Status: SHIPPED | OUTPATIENT
Start: 2023-01-30

## 2023-01-30 RX ORDER — PROPRANOLOL HYDROCHLORIDE 60 MG/1
60 CAPSULE, EXTENDED RELEASE ORAL DAILY
Qty: 90 CAPSULE | Refills: 3 | Status: SHIPPED | OUTPATIENT
Start: 2023-01-30

## 2023-01-30 RX ORDER — IBANDRONATE SODIUM 150 MG/1
150 TABLET, FILM COATED ORAL
Qty: 90 TABLET | Refills: 3 | Status: SHIPPED | OUTPATIENT
Start: 2023-01-30

## 2023-01-30 RX ORDER — ATORVASTATIN CALCIUM 40 MG/1
40 TABLET, FILM COATED ORAL NIGHTLY
Qty: 90 TABLET | Refills: 3 | Status: SHIPPED | OUTPATIENT
Start: 2023-01-30

## 2023-01-30 NOTE — PROGRESS NOTES
[facility] - Clinic Note    Subjective      Ms. Lester is a 67 y.o. female who presents to clinic for Follow-up (6mth follow up/refills/)    Patient has been doing well.  Blood pressure is controlled.  Three weeks postop from knee replacement surgery.  Going to start outpatient physical therapy at [facility] tomorrow.    PMLYN Batista has a past medical history of Carpal tunnel syndrome, Hypertension, Hypothyroidism (2/17/2022), and Osteoarthritis.   PSXH Lynne has a past surgical history that includes Tonsillectomy; Appendectomy; Hysterectomy; Oophorectomy; Cataract extraction, bilateral; Bladder repair; Colonoscopy (N/A, 09/08/2022); Bariatric Surgery; and Total knee arthroplasty (Right, 1/9/2023).   ЮЛИЯ Batista's family history includes Alzheimer's disease in her mother; Breast cancer in her maternal aunt; COPD in her father; Cancer in her paternal grandmother.   GLEN Batista reports that she has quit smoking. She has never used smokeless tobacco. She reports current alcohol use. She reports that she does not use drugs.   MICHAELA Batista is allergic to codeine.   DENNIS Batista has a current medication list which includes the following prescription(s): cholecalciferol (vitamin d3), meloxicam, sumatriptan, atorvastatin, fluoxetine, ibandronate, levothyroxine, and propranolol.     Review of Systems   Constitutional:  Negative for chills and fever.   HENT:  Negative for congestion and rhinorrhea.    Eyes:  Negative for visual disturbance.   Respiratory:  Negative for cough and shortness of breath.    Cardiovascular:  Negative for chest pain.   Gastrointestinal:  Negative for abdominal pain, constipation, diarrhea, nausea and vomiting.   Genitourinary:  Negative for dysuria.   Musculoskeletal:  Positive for arthralgias. Negative for myalgias.   Skin:  Negative for rash.   Neurological:  Negative for weakness and headaches.   Objective     /82 (BP Location: Left arm, Patient Position: Sitting, BP Method: X-Large  "(Manual))   Pulse 70   Temp 98.5 °F (36.9 °C) (Temporal)   Resp 18   Ht 5' 7" (1.702 m)   Wt 86.5 kg (190 lb 12.8 oz)   SpO2 99%   BMI 29.88 kg/m²     Physical Exam  Vitals and nursing note reviewed.   Constitutional:       General: She is not in acute distress.     Appearance: Normal appearance. She is well-developed. She is not diaphoretic.   HENT:      Head: Normocephalic and atraumatic.      Right Ear: External ear normal.      Left Ear: External ear normal.   Eyes:      General:         Right eye: No discharge.         Left eye: No discharge.   Cardiovascular:      Rate and Rhythm: Normal rate and regular rhythm.      Heart sounds: Normal heart sounds.   Pulmonary:      Effort: Pulmonary effort is normal.      Breath sounds: Normal breath sounds. No wheezing or rales.   Skin:     General: Skin is warm and dry.   Neurological:      Mental Status: She is alert and oriented to person, place, and time. Mental status is at baseline.   Psychiatric:         Mood and Affect: Mood normal.         Behavior: Behavior normal.         Thought Content: Thought content normal.         Judgment: Judgment normal.      Assessment/Plan     1. Hypothyroidism, unspecified type  levothyroxine (SYNTHROID) 88 MCG tablet      2. Chronic migraine without aura without status migrainosus, not intractable  propranoloL (INDERAL LA) 60 MG 24 hr capsule      3. Hyperlipidemia, unspecified hyperlipidemia type  atorvastatin (LIPITOR) 40 MG tablet      4. Age related osteoporosis, unspecified pathological fracture presence  ibandronate (BONIVA) 150 mg tablet        Needs refills as above.  Continue physical therapy.  Follow-up in 6 months for annual exam.    Future Appointments   Date Time Provider Department Center   2/28/2023 11:15 AM Ish Alford DO [facility]   7/28/2023 10:00 AM Tyler Moreno MD [facility]         Tyler Moreno MD  Family Medicine  [facility] " [place]

## 2023-02-06 ENCOUNTER — EXTERNAL HOME HEALTH (OUTPATIENT)
Dept: HOME HEALTH SERVICES | Facility: HOSPITAL | Age: 68
End: 2023-02-06
Payer: MEDICARE

## 2023-02-07 ENCOUNTER — DOCUMENTATION ONLY (OUTPATIENT)
Dept: ORTHOPEDICS | Facility: CLINIC | Age: 68
End: 2023-02-07
Payer: MEDICARE

## 2023-02-07 NOTE — PROGRESS NOTES
[employer]'s plan of care was reviewed and signed by provider. The plan of care was successfully faxed to [phone].    Batched to medical records.

## 2023-02-13 DIAGNOSIS — Z96.651 S/P TKR (TOTAL KNEE REPLACEMENT), RIGHT: Primary | ICD-10-CM

## 2023-02-28 ENCOUNTER — HOSPITAL ENCOUNTER (OUTPATIENT)
Dept: RADIOLOGY | Facility: HOSPITAL | Age: 68
Discharge: HOME | End: 2023-02-28
Attending: ORTHOPAEDIC SURGERY
Payer: MEDICARE

## 2023-02-28 ENCOUNTER — OFFICE VISIT (OUTPATIENT)
Dept: ORTHOPEDICS | Facility: CLINIC | Age: 68
End: 2023-02-28
Payer: MEDICARE

## 2023-02-28 VITALS — RESPIRATION RATE: 16 BRPM | WEIGHT: 190.69 LBS | HEIGHT: 67 IN | BODY MASS INDEX: 29.93 KG/M2

## 2023-02-28 DIAGNOSIS — Z96.651 S/P TKR (TOTAL KNEE REPLACEMENT), RIGHT: Primary | ICD-10-CM

## 2023-02-28 DIAGNOSIS — Z96.651 S/P TKR (TOTAL KNEE REPLACEMENT), RIGHT: ICD-10-CM

## 2023-02-28 PROCEDURE — 73562 XR KNEE 3 VIEW RIGHT: ICD-10-PCS | Mod: 26,RT,, | Performed by: RADIOLOGY

## 2023-02-28 PROCEDURE — 73562 X-RAY EXAM OF KNEE 3: CPT | Mod: TC,PN,RT

## 2023-02-28 PROCEDURE — 1159F MED LIST DOCD IN RCRD: CPT | Mod: CPTII,S$GLB,, | Performed by: ORTHOPAEDIC SURGERY

## 2023-02-28 PROCEDURE — 99024 PR POST-OP FOLLOW-UP VISIT: ICD-10-PCS | Mod: S$GLB,,, | Performed by: ORTHOPAEDIC SURGERY

## 2023-02-28 PROCEDURE — 99999 PR PBB SHADOW E&M-EST. PATIENT-LVL III: CPT | Mod: PBBFAC,,, | Performed by: ORTHOPAEDIC SURGERY

## 2023-02-28 PROCEDURE — 3288F PR FALLS RISK ASSESSMENT DOCUMENTED: ICD-10-PCS | Mod: CPTII,S$GLB,, | Performed by: ORTHOPAEDIC SURGERY

## 2023-02-28 PROCEDURE — 99999 PR PBB SHADOW E&M-EST. PATIENT-LVL III: ICD-10-PCS | Mod: PBBFAC,,, | Performed by: ORTHOPAEDIC SURGERY

## 2023-02-28 PROCEDURE — 3288F FALL RISK ASSESSMENT DOCD: CPT | Mod: CPTII,S$GLB,, | Performed by: ORTHOPAEDIC SURGERY

## 2023-02-28 PROCEDURE — 1101F PR PT FALLS ASSESS DOC 0-1 FALLS W/OUT INJ PAST YR: ICD-10-PCS | Mod: CPTII,S$GLB,, | Performed by: ORTHOPAEDIC SURGERY

## 2023-02-28 PROCEDURE — 1125F PR PAIN SEVERITY QUANTIFIED, PAIN PRESENT: ICD-10-PCS | Mod: CPTII,S$GLB,, | Performed by: ORTHOPAEDIC SURGERY

## 2023-02-28 PROCEDURE — 73562 X-RAY EXAM OF KNEE 3: CPT | Mod: 26,RT,, | Performed by: RADIOLOGY

## 2023-02-28 PROCEDURE — 99024 POSTOP FOLLOW-UP VISIT: CPT | Mod: S$GLB,,, | Performed by: ORTHOPAEDIC SURGERY

## 2023-02-28 PROCEDURE — 3008F PR BODY MASS INDEX (BMI) DOCUMENTED: ICD-10-PCS | Mod: CPTII,S$GLB,, | Performed by: ORTHOPAEDIC SURGERY

## 2023-02-28 PROCEDURE — 3008F BODY MASS INDEX DOCD: CPT | Mod: CPTII,S$GLB,, | Performed by: ORTHOPAEDIC SURGERY

## 2023-02-28 PROCEDURE — 1101F PT FALLS ASSESS-DOCD LE1/YR: CPT | Mod: CPTII,S$GLB,, | Performed by: ORTHOPAEDIC SURGERY

## 2023-02-28 PROCEDURE — 1159F PR MEDICATION LIST DOCUMENTED IN MEDICAL RECORD: ICD-10-PCS | Mod: CPTII,S$GLB,, | Performed by: ORTHOPAEDIC SURGERY

## 2023-02-28 PROCEDURE — 1125F AMNT PAIN NOTED PAIN PRSNT: CPT | Mod: CPTII,S$GLB,, | Performed by: ORTHOPAEDIC SURGERY

## 2023-02-28 NOTE — PROGRESS NOTES
Patient ID: Lynne Lester is a 67 y.o. female.     Chief Complaint: Post-op Evaluation and Pain of the Right Knee        HPI:  Ms. Lester returned today for a 6 week follow-up on a right total knee arthroplasty.  She stated she is doing well she has been going to physical therapy.  She is relatively pain-free.  Has also been doing home exercises.  The pain in her left knee has resolved after she had a steroid injection at her last visit on 0 1/20/2023  Her date of surgery 01/09/2023.  S.     ROS:  No new diagnosis/surgery/prescriptions since last office visit on 01/20/2023  Constitutional: Negative for chills and fever.   HENT: Negative for congestion.    Eyes: Negative for blurred vision.   Cardiovascular: Negative for chest pain.   Respiratory: Negative for cough.    Endocrine: Negative for polydipsia.   Hematologic/Lymphatic: Negative for adenopathy.   Skin: Negative for flushing and itching.   Musculoskeletal: Positive for joint pain. Negative for gout.   Gastrointestinal: Negative for constipation, diarrhea and heartburn.   Genitourinary: Negative for nocturia.   Neurological: Positive for headaches. Negative for seizures.   Psychiatric/Behavioral: Positive for depression. Negative for substance abuse. The patient is not nervous/anxious.    Allergic/Immunologic: Positive for environmental allergies.       Objective:   Physical Exam:   General: AAOx3.  No acute distress  Vascular:  Pulses intact and equal bilaterally.  Capillary refill less than 3 seconds and equal bilaterally  Neurologic:  Pinprick and soft touch intact and equal bilaterally  Integment:  Incision well approximated and well healed.  Extremity:  Knee: Extension/flexion right knee 0/greater than 95 degrees, left knee 0/118 degrees.  Nontender with motion right knee.  Varus/valgus stressing with good endpoint right knee.  No effusion right knee.  Calves soft.  Homans negative.  Radiography:  Personally reviewed x-rays of the right knee  completed on 02/28/2023 which showed a well-seated well-aligned total knee arthroplasty without signs of loosening      Assessment:       Impression:      1.  Status post right total knee arthroplasty          Plan:       1.  Discussed physical examination and radiographic evaluation with the patient. Lynne understands that she appears to be doing well.    2. Continue with physical therapy.    3. Continue doing home exercises especially quadriceps and hamstring strengthening.  4. Any minor pain can be treated with over-the-counter medications dosed per box instructions.    5. May return to work with standing and walking as tolerated no work on ladders/scaffolding/stairs.  6. Follow up in 6 weeks with an x-ray of the right knee

## 2023-02-28 NOTE — LETTER
February 28, 2023      [facility]  [address]  [place] [address]  Phone: [phone]  Fax: [phone]       Patient: Lynne Lester   YOB: 1955  Date of Visit: 02/28/2023    To Whom It May Concern:    Jefe Lester  was at [facility] on 02/28/2023. The patient may return to work on 3/1/23 with restrictions. Her restrictions are as follows: walking and standing as tolerated. No work on ladders/scaffolding/stairs. If you have any questions or concerns, or if I can be of further assistance, please do not hesitate to contact me.    Sincerely,        Kaylee Lockett LPN

## 2023-03-27 ENCOUNTER — DOCUMENTATION ONLY (OUTPATIENT)
Dept: ORTHOPEDICS | Facility: CLINIC | Age: 68
End: 2023-03-27
Payer: MEDICARE

## 2023-03-27 NOTE — PROGRESS NOTES
[email or web address]'s re-certification note was signed and successfully faxed to [phone].    Batched to medical records.

## 2023-04-03 DIAGNOSIS — Z96.651 S/P TKR (TOTAL KNEE REPLACEMENT), RIGHT: Primary | ICD-10-CM

## 2023-04-11 ENCOUNTER — HOSPITAL ENCOUNTER (OUTPATIENT)
Dept: RADIOLOGY | Facility: HOSPITAL | Age: 68
Discharge: HOME | End: 2023-04-11
Attending: ORTHOPAEDIC SURGERY
Payer: MEDICARE

## 2023-04-11 ENCOUNTER — OFFICE VISIT (OUTPATIENT)
Dept: ORTHOPEDICS | Facility: CLINIC | Age: 68
End: 2023-04-11
Payer: MEDICARE

## 2023-04-11 VITALS — BODY MASS INDEX: 29.93 KG/M2 | HEIGHT: 67 IN | RESPIRATION RATE: 17 BRPM | WEIGHT: 190.69 LBS

## 2023-04-11 DIAGNOSIS — Z96.651 S/P TKR (TOTAL KNEE REPLACEMENT), RIGHT: ICD-10-CM

## 2023-04-11 DIAGNOSIS — Z96.651 S/P TKR (TOTAL KNEE REPLACEMENT), RIGHT: Primary | ICD-10-CM

## 2023-04-11 PROCEDURE — 99999 PR PBB SHADOW E&M-EST. PATIENT-LVL III: CPT | Mod: PBBFAC,,, | Performed by: ORTHOPAEDIC SURGERY

## 2023-04-11 PROCEDURE — 1159F MED LIST DOCD IN RCRD: CPT | Mod: CPTII,S$GLB,, | Performed by: ORTHOPAEDIC SURGERY

## 2023-04-11 PROCEDURE — 3288F PR FALLS RISK ASSESSMENT DOCUMENTED: ICD-10-PCS | Mod: CPTII,S$GLB,, | Performed by: ORTHOPAEDIC SURGERY

## 2023-04-11 PROCEDURE — 1159F PR MEDICATION LIST DOCUMENTED IN MEDICAL RECORD: ICD-10-PCS | Mod: CPTII,S$GLB,, | Performed by: ORTHOPAEDIC SURGERY

## 2023-04-11 PROCEDURE — 99213 PR OFFICE/OUTPT VISIT, EST, LEVL III, 20-29 MIN: ICD-10-PCS | Mod: S$GLB,,, | Performed by: ORTHOPAEDIC SURGERY

## 2023-04-11 PROCEDURE — 73562 X-RAY EXAM OF KNEE 3: CPT | Mod: TC,PN,RT

## 2023-04-11 PROCEDURE — 3008F PR BODY MASS INDEX (BMI) DOCUMENTED: ICD-10-PCS | Mod: CPTII,S$GLB,, | Performed by: ORTHOPAEDIC SURGERY

## 2023-04-11 PROCEDURE — 1125F AMNT PAIN NOTED PAIN PRSNT: CPT | Mod: CPTII,S$GLB,, | Performed by: ORTHOPAEDIC SURGERY

## 2023-04-11 PROCEDURE — 3008F BODY MASS INDEX DOCD: CPT | Mod: CPTII,S$GLB,, | Performed by: ORTHOPAEDIC SURGERY

## 2023-04-11 PROCEDURE — 99999 PR PBB SHADOW E&M-EST. PATIENT-LVL III: ICD-10-PCS | Mod: PBBFAC,,, | Performed by: ORTHOPAEDIC SURGERY

## 2023-04-11 PROCEDURE — 3288F FALL RISK ASSESSMENT DOCD: CPT | Mod: CPTII,S$GLB,, | Performed by: ORTHOPAEDIC SURGERY

## 2023-04-11 PROCEDURE — 73562 XR KNEE 3 VIEW RIGHT: ICD-10-PCS | Mod: 26,RT,, | Performed by: RADIOLOGY

## 2023-04-11 PROCEDURE — 1101F PR PT FALLS ASSESS DOC 0-1 FALLS W/OUT INJ PAST YR: ICD-10-PCS | Mod: CPTII,S$GLB,, | Performed by: ORTHOPAEDIC SURGERY

## 2023-04-11 PROCEDURE — 1125F PR PAIN SEVERITY QUANTIFIED, PAIN PRESENT: ICD-10-PCS | Mod: CPTII,S$GLB,, | Performed by: ORTHOPAEDIC SURGERY

## 2023-04-11 PROCEDURE — 1101F PT FALLS ASSESS-DOCD LE1/YR: CPT | Mod: CPTII,S$GLB,, | Performed by: ORTHOPAEDIC SURGERY

## 2023-04-11 PROCEDURE — 73562 X-RAY EXAM OF KNEE 3: CPT | Mod: 26,RT,, | Performed by: RADIOLOGY

## 2023-04-11 PROCEDURE — 99213 OFFICE O/P EST LOW 20 MIN: CPT | Mod: S$GLB,,, | Performed by: ORTHOPAEDIC SURGERY

## 2023-04-11 NOTE — PROGRESS NOTES
Patient ID: Lynne Lester is a 67 y.o. female.     Chief Complaint: Post-op Evaluation and Pain of the Right Knee        HPI:  Ms. Lester returned today for a 3 month follow-up on a right total knee arthroplasty.  She stated she is doing well and is pain-free.   Her date of surgery 01/09/2023.  She finished physical therapy.     ROS:  No new diagnosis/surgery/prescriptions since last office visit on 02/28/2023.  Constitutional: Negative for chills and fever.   HENT: Negative for congestion.    Eyes: Negative for blurred vision.   Cardiovascular: Negative for chest pain.   Respiratory: Negative for cough.    Endocrine: Negative for polydipsia.   Hematologic/Lymphatic: Negative for adenopathy.   Skin: Negative for flushing and itching.   Musculoskeletal: Positive for joint pain. Negative for gout.   Gastrointestinal: Negative for constipation, diarrhea and heartburn.   Genitourinary: Negative for nocturia.   Neurological: Positive for headaches. Negative for seizures.   Psychiatric/Behavioral: Positive for depression. Negative for substance abuse. The patient is not nervous/anxious.    Allergic/Immunologic: Positive for environmental allergies.       Objective:   Physical Exam:   General: AAOx3.  No acute distress  Vascular:  Pulses intact and equal bilaterally.  Capillary refill less than 3 seconds and equal bilaterally  Neurologic:  Pinprick and soft touch intact and equal bilaterally  Integment:  Incision well approximated and well healed.  Extremity:  Knee: Extension/flexion both knees essentially equal 0/118 degrees..  Nontender with motion right knee.  No effusion Varus/valgus stressing with good endpoint both knees.  Barrow negative both knees  negative patellar load/compression bilaterally Calves soft.  Homans negative.  Radiography:  Personally reviewed x-rays of the right knee completed on 04/11/2023 which showed a well-seated well-aligned total knee arthroplasty without signs of loosening       Assessment:       Impression:      1.  Status post right total knee arthroplasty          Plan:       1.  Discussed physical examination and radiographic evaluation with the patient. Lynne understands that she appears to be doing well and has progressed well.    2. Finish current physical therapy prescription and discharged to [facility].  3. Continue doing home exercises especially quadriceps and hamstring strengthening.  4. Any minor pain can be treated with over-the-counter medications dosed per box instructions.    5. Follow up in 3 months with an x-ray of the right knee

## 2023-06-19 DIAGNOSIS — Z96.651 S/P TKR (TOTAL KNEE REPLACEMENT), RIGHT: Primary | ICD-10-CM

## 2023-06-27 ENCOUNTER — LAB VISIT (OUTPATIENT)
Dept: LAB | Facility: HOSPITAL | Age: 68
End: 2023-06-27
Attending: NURSE PRACTITIONER
Payer: MEDICARE

## 2023-06-27 DIAGNOSIS — I51.9 MYXEDEMA HEART DISEASE: Primary | ICD-10-CM

## 2023-06-27 DIAGNOSIS — E03.9 MYXEDEMA HEART DISEASE: Primary | ICD-10-CM

## 2023-06-27 LAB
ALBUMIN SERPL BCP-MCNC: 3.7 G/DL (ref 3.5–5.2)
ALP SERPL-CCNC: 57 U/L (ref 55–135)
ALT SERPL W/O P-5'-P-CCNC: 14 U/L (ref 10–44)
ANION GAP SERPL CALC-SCNC: 10 MMOL/L (ref 8–16)
AST SERPL-CCNC: 20 U/L (ref 10–40)
BILIRUB SERPL-MCNC: 0.1 MG/DL (ref 0.1–1)
BUN SERPL-MCNC: 19 MG/DL (ref 8–23)
CALCIUM SERPL-MCNC: 8.7 MG/DL (ref 8.7–10.5)
CHLORIDE SERPL-SCNC: 104 MMOL/L (ref 95–110)
CHOLEST SERPL-MCNC: 251 MG/DL (ref 120–199)
CHOLEST/HDLC SERPL: 3.7 {RATIO} (ref 2–5)
CO2 SERPL-SCNC: 25 MMOL/L (ref 23–29)
CREAT SERPL-MCNC: 1 MG/DL (ref 0.5–1.4)
ERYTHROCYTE [DISTWIDTH] IN BLOOD BY AUTOMATED COUNT: 15.1 % (ref 11.5–14.5)
EST. GFR  (NO RACE VARIABLE): >60 ML/MIN/1.73 M^2
GLUCOSE SERPL-MCNC: 87 MG/DL (ref 70–110)
HCT VFR BLD AUTO: 34.3 % (ref 37–48.5)
HDLC SERPL-MCNC: 68 MG/DL (ref 40–75)
HDLC SERPL: 27.1 % (ref 20–50)
HGB BLD-MCNC: 11.3 G/DL (ref 12–16)
LDLC SERPL CALC-MCNC: 170.2 MG/DL (ref 63–159)
MCH RBC QN AUTO: 33.3 PG (ref 27–31)
MCHC RBC AUTO-ENTMCNC: 32.9 G/DL (ref 32–36)
MCV RBC AUTO: 101 FL (ref 82–98)
NONHDLC SERPL-MCNC: 183 MG/DL
PLATELET # BLD AUTO: 194 K/UL (ref 150–450)
PMV BLD AUTO: 10.5 FL (ref 9.2–12.9)
POTASSIUM SERPL-SCNC: 4 MMOL/L (ref 3.5–5.1)
PROT SERPL-MCNC: 6.4 G/DL (ref 6–8.4)
RBC # BLD AUTO: 3.39 M/UL (ref 4–5.4)
SODIUM SERPL-SCNC: 139 MMOL/L (ref 136–145)
TRIGL SERPL-MCNC: 64 MG/DL (ref 30–150)
TSH SERPL DL<=0.005 MIU/L-ACNC: 1.86 UIU/ML (ref 0.4–4)
WBC # BLD AUTO: 5.81 K/UL (ref 3.9–12.7)

## 2023-06-27 PROCEDURE — 85027 COMPLETE CBC AUTOMATED: CPT | Performed by: NURSE PRACTITIONER

## 2023-06-27 PROCEDURE — 80053 COMPREHEN METABOLIC PANEL: CPT | Performed by: NURSE PRACTITIONER

## 2023-06-27 PROCEDURE — 80061 LIPID PANEL: CPT | Performed by: NURSE PRACTITIONER

## 2023-06-27 PROCEDURE — 84443 ASSAY THYROID STIM HORMONE: CPT | Performed by: NURSE PRACTITIONER

## 2023-06-27 PROCEDURE — 36415 COLL VENOUS BLD VENIPUNCTURE: CPT | Performed by: NURSE PRACTITIONER

## 2023-07-11 ENCOUNTER — OFFICE VISIT (OUTPATIENT)
Dept: ORTHOPEDICS | Facility: CLINIC | Age: 68
End: 2023-07-11
Payer: MEDICARE

## 2023-07-11 ENCOUNTER — HOSPITAL ENCOUNTER (OUTPATIENT)
Dept: RADIOLOGY | Facility: HOSPITAL | Age: 68
Discharge: HOME | End: 2023-07-11
Attending: ORTHOPAEDIC SURGERY
Payer: MEDICARE

## 2023-07-11 VITALS — HEIGHT: 67 IN | RESPIRATION RATE: 18 BRPM | BODY MASS INDEX: 29.93 KG/M2 | WEIGHT: 190.69 LBS

## 2023-07-11 DIAGNOSIS — Z96.651 S/P TKR (TOTAL KNEE REPLACEMENT), RIGHT: ICD-10-CM

## 2023-07-11 DIAGNOSIS — Z96.651 S/P TKR (TOTAL KNEE REPLACEMENT), RIGHT: Primary | ICD-10-CM

## 2023-07-11 PROCEDURE — 1159F PR MEDICATION LIST DOCUMENTED IN MEDICAL RECORD: ICD-10-PCS | Mod: CPTII,S$GLB,, | Performed by: ORTHOPAEDIC SURGERY

## 2023-07-11 PROCEDURE — 3288F PR FALLS RISK ASSESSMENT DOCUMENTED: ICD-10-PCS | Mod: CPTII,S$GLB,, | Performed by: ORTHOPAEDIC SURGERY

## 2023-07-11 PROCEDURE — 73562 XR KNEE 3 VIEW RIGHT: ICD-10-PCS | Mod: 26,RT,, | Performed by: RADIOLOGY

## 2023-07-11 PROCEDURE — 1101F PT FALLS ASSESS-DOCD LE1/YR: CPT | Mod: CPTII,S$GLB,, | Performed by: ORTHOPAEDIC SURGERY

## 2023-07-11 PROCEDURE — 99213 OFFICE O/P EST LOW 20 MIN: CPT | Mod: S$GLB,,, | Performed by: ORTHOPAEDIC SURGERY

## 2023-07-11 PROCEDURE — 99999 PR PBB SHADOW E&M-EST. PATIENT-LVL III: CPT | Mod: PBBFAC,,, | Performed by: ORTHOPAEDIC SURGERY

## 2023-07-11 PROCEDURE — 1159F MED LIST DOCD IN RCRD: CPT | Mod: CPTII,S$GLB,, | Performed by: ORTHOPAEDIC SURGERY

## 2023-07-11 PROCEDURE — 73562 X-RAY EXAM OF KNEE 3: CPT | Mod: TC,PN,RT

## 2023-07-11 PROCEDURE — 3008F PR BODY MASS INDEX (BMI) DOCUMENTED: ICD-10-PCS | Mod: CPTII,S$GLB,, | Performed by: ORTHOPAEDIC SURGERY

## 2023-07-11 PROCEDURE — 99999 PR PBB SHADOW E&M-EST. PATIENT-LVL III: ICD-10-PCS | Mod: PBBFAC,,, | Performed by: ORTHOPAEDIC SURGERY

## 2023-07-11 PROCEDURE — 99213 PR OFFICE/OUTPT VISIT, EST, LEVL III, 20-29 MIN: ICD-10-PCS | Mod: S$GLB,,, | Performed by: ORTHOPAEDIC SURGERY

## 2023-07-11 PROCEDURE — 3288F FALL RISK ASSESSMENT DOCD: CPT | Mod: CPTII,S$GLB,, | Performed by: ORTHOPAEDIC SURGERY

## 2023-07-11 PROCEDURE — 3008F BODY MASS INDEX DOCD: CPT | Mod: CPTII,S$GLB,, | Performed by: ORTHOPAEDIC SURGERY

## 2023-07-11 PROCEDURE — 1101F PR PT FALLS ASSESS DOC 0-1 FALLS W/OUT INJ PAST YR: ICD-10-PCS | Mod: CPTII,S$GLB,, | Performed by: ORTHOPAEDIC SURGERY

## 2023-07-11 PROCEDURE — 1126F PR PAIN SEVERITY QUANTIFIED, NO PAIN PRESENT: ICD-10-PCS | Mod: CPTII,S$GLB,, | Performed by: ORTHOPAEDIC SURGERY

## 2023-07-11 PROCEDURE — 1126F AMNT PAIN NOTED NONE PRSNT: CPT | Mod: CPTII,S$GLB,, | Performed by: ORTHOPAEDIC SURGERY

## 2023-07-11 PROCEDURE — 73562 X-RAY EXAM OF KNEE 3: CPT | Mod: 26,RT,, | Performed by: RADIOLOGY

## 2023-07-11 NOTE — PROGRESS NOTES
Patient ID: Lynne Lester is a 67 y.o. female.     Chief Complaint: Post-op Evaluation and Pain of the Right Knee        HPI:  Ms. Lester returned today for a 6 month follow-up on a right total knee arthroplasty.  She stated she is doing well and is pain-free.   Her date of surgery 01/09/2023.  She exercising and using a stationary bicycle she has noticed she is a little stiff every once in a while..     ROS:  No new diagnosis/surgery/prescriptions since last office visit on 04/11/2023  Constitutional: Negative for chills and fever.   HENT: Negative for congestion.    Eyes: Negative for blurred vision.   Cardiovascular: Negative for chest pain.   Respiratory: Negative for cough.    Endocrine: Negative for polydipsia.   Hematologic/Lymphatic: Negative for adenopathy.   Skin: Negative for flushing and itching.   Musculoskeletal: Positive for joint pain. Negative for gout.   Gastrointestinal: Negative for constipation, diarrhea and heartburn.   Genitourinary: Negative for nocturia.   Neurological: Positive for headaches. Negative for seizures.   Psychiatric/Behavioral: Positive for depression. Negative for substance abuse. The patient is not nervous/anxious.    Allergic/Immunologic: Positive for environmental allergies.       Objective:   Physical Exam:   General: AAOx3.  No acute distress  Vascular:  Pulses intact and equal bilaterally.  Capillary refill less than 3 seconds and equal bilaterally  Neurologic:  Pinprick and soft touch intact and equal bilaterally  Integment:  Incision well approximated and well healed.  Extremity:  Knee: Extension/flexion both knees essentially equal 0/118 degrees..  Nontender with motion right knee.  No effusion Varus/valgus stressing with good endpoint both knees.  [place] negative both knees  negative patellar load/compression bilaterally [place] soft.  Homans negative.  Radiography:  Personally reviewed x-rays of the right knee completed on 07/11/2023 which showed a  well-seated well-aligned total knee arthroplasty without signs of loosening      Assessment:       Impression:      1.  Status post right total knee arthroplasty          Plan:       1.  Discussed physical examination and radiographic evaluation with the patient. Lynne understands that she is doing well and has progressed well.    2. Continue doing home exercises especially quadriceps and hamstring strengthening.  3. Any minor pain can be treated with over-the-counter medications dosed per box instructions.    6. Follow up in 6 months with an x-ray of the right knee

## 2024-01-02 DIAGNOSIS — Z96.651 S/P TKR (TOTAL KNEE REPLACEMENT), RIGHT: Primary | ICD-10-CM

## 2025-08-20 ENCOUNTER — HOSPITAL ENCOUNTER (OUTPATIENT)
Dept: RADIOLOGY | Facility: HOSPITAL | Age: 70
Discharge: HOME | End: 2025-08-20
Attending: NURSE PRACTITIONER
Payer: COMMERCIAL

## 2025-08-20 DIAGNOSIS — M79.673 PAIN IN FOOT: ICD-10-CM

## 2025-08-20 DIAGNOSIS — M25.579 PAIN IN ANKLE: ICD-10-CM

## 2025-08-20 DIAGNOSIS — M79.673 PAIN IN FOOT: Primary | ICD-10-CM

## 2025-08-20 PROCEDURE — 73630 X-RAY EXAM OF FOOT: CPT | Mod: 26,RT,, | Performed by: RADIOLOGY

## 2025-08-20 PROCEDURE — 73630 X-RAY EXAM OF FOOT: CPT | Mod: TC,RT

## 2025-08-26 ENCOUNTER — OFFICE VISIT (OUTPATIENT)
Dept: PODIATRY | Facility: CLINIC | Age: 70
End: 2025-08-26
Payer: COMMERCIAL

## (undated) DEVICE — BNDG COFLEX FOAM LF2 ST 6X5YD

## (undated) DEVICE — BOWL MIXING BONE CEMENT

## (undated) DEVICE — GLOVE SURG ULTRA TOUCH 8

## (undated) DEVICE — SEE MEDLINE ITEM 157216

## (undated) DEVICE — GLOVE SURG ULTRA TOUCH 9

## (undated) DEVICE — DRAPE U STD FILM LG 60X84IN

## (undated) DEVICE — KIT CANIST SUCTION 1200CC

## (undated) DEVICE — DRAPE STERI INSTRUMENT 1018

## (undated) DEVICE — INTERPULSE SET

## (undated) DEVICE — SUT FIBERWIRE 2 38 IN TAPER

## (undated) DEVICE — GLOVE SURG ULTRA TOUCH 7.5

## (undated) DEVICE — GOWN POLY REINF BRTH SLV LG

## (undated) DEVICE — SOL WATER STRL IRR 1000ML

## (undated) DEVICE — DRESSING TRANS 4X4 TEGADERM

## (undated) DEVICE — SOL 9P NACL IRR PIC IL

## (undated) DEVICE — SPONGE GAUZE 16PLY 4X4

## (undated) DEVICE — GLOVE PI ULTRA TOUCH G SURGEON

## (undated) DEVICE — Device

## (undated) DEVICE — SOL IRR NACL .9% 3000ML

## (undated) DEVICE — SUT 0 36IN COATED VICRYL VI

## (undated) DEVICE — DRAPE THREE-QUARTER 53X77IN

## (undated) DEVICE — TOURNIQUET SB QC SP 34X4IN

## (undated) DEVICE — DRAPE STERI U-SHAPED 47X51IN

## (undated) DEVICE — BLADE SAW SGTL DL STR 25X1.27X

## (undated) DEVICE — BANDAGE ESMARK ELASTIC ST 6X9

## (undated) DEVICE — KIT ENDO CARRY-ON PROC 100310

## (undated) DEVICE — BNDG COFLEX FOAM LF2 ST 4X5YD

## (undated) DEVICE — UNDERGLOVE BIOGEL PI SZ 6.5 LF

## (undated) DEVICE — SUT 2-0 VICRYL / CT-1

## (undated) DEVICE — PAD SUREFIT GRND ELECTRD 10FT

## (undated) DEVICE — DRAPE INCISE IOBAN 2 23X33IN

## (undated) DEVICE — SUT MONO 3-0 PS-2 18 PLST

## (undated) DEVICE — TOWEL OR DISP STRL BLUE 4/PK

## (undated) DEVICE — GOWN SURGICAL BRTHBL XXL

## (undated) DEVICE — CLOSURE SKIN STERI STRIP 1/2X4

## (undated) DEVICE — DRESSING ADAPTIC N ADH 3X8IN

## (undated) DEVICE — GLOVE SURG ULTRA TOUCH 7

## (undated) DEVICE — GLOVE SURGEONS ULTRA TOUCH 6.5

## (undated) DEVICE — COVER TABLE BACK 44IN X 90IN

## (undated) DEVICE — SPONGE LAP 18X18 PREWASHED

## (undated) DEVICE — DRAPE T EXTRM SURG 121X128X90

## (undated) DEVICE — GOWN POLY REINF X-LONG XL

## (undated) DEVICE — LABEL FOR UTILITY MARKER

## (undated) DEVICE — CUBE COLD THERAPY POLAR CARE

## (undated) DEVICE — BANDAGE MATRIX HK LOOP 6IN 5YD

## (undated) DEVICE — TRAY CATH FOL SIL TEMP 10 16FR

## (undated) DEVICE — TIP YANKAUERS BULB NO VENT

## (undated) DEVICE — GLOVE GAMMEX SURG LF PI SZ 8